# Patient Record
Sex: FEMALE | Race: WHITE | Employment: FULL TIME | URBAN - METROPOLITAN AREA
[De-identification: names, ages, dates, MRNs, and addresses within clinical notes are randomized per-mention and may not be internally consistent; named-entity substitution may affect disease eponyms.]

---

## 2017-02-17 ENCOUNTER — OFFICE VISIT (OUTPATIENT)
Dept: FAMILY MEDICINE CLINIC | Age: 32
End: 2017-02-17

## 2017-02-17 VITALS
DIASTOLIC BLOOD PRESSURE: 78 MMHG | SYSTOLIC BLOOD PRESSURE: 121 MMHG | WEIGHT: 194 LBS | HEART RATE: 95 BPM | BODY MASS INDEX: 35.7 KG/M2 | TEMPERATURE: 98.7 F | HEIGHT: 62 IN | RESPIRATION RATE: 16 BRPM

## 2017-02-17 DIAGNOSIS — M79.651 ACUTE THIGH PAIN, RIGHT: ICD-10-CM

## 2017-02-17 DIAGNOSIS — F41.8 DEPRESSION WITH ANXIETY: ICD-10-CM

## 2017-02-17 DIAGNOSIS — E03.4 HYPOTHYROIDISM DUE TO ACQUIRED ATROPHY OF THYROID: Primary | ICD-10-CM

## 2017-02-17 DIAGNOSIS — R20.2 NUMBNESS AND TINGLING: ICD-10-CM

## 2017-02-17 DIAGNOSIS — R20.0 NUMBNESS AND TINGLING: ICD-10-CM

## 2017-02-17 RX ORDER — LEVOTHYROXINE SODIUM 25 UG/1
25 TABLET ORAL
Qty: 30 TAB | Refills: 0 | Status: SHIPPED | OUTPATIENT
Start: 2017-02-17 | End: 2017-07-05 | Stop reason: SDUPTHER

## 2017-02-17 NOTE — MR AVS SNAPSHOT
Visit Information Date & Time Provider Department Dept. Phone Encounter #  
 2/17/2017 12:30 PM Mitul Thayer NP [facility] [id number] Follow-up Instructions Return if symptoms worsen or fail to improve. Upcoming Health Maintenance Date Due DTaP/Tdap/Td series (1 - Tdap) 12/7/2007 PAP AKA CERVICAL CYTOLOGY 3/21/2014 INFLUENZA AGE 9 TO ADULT 8/1/2016 Allergies as of 2/17/2017  Review Complete On: 2/17/2017 By: Alex Arboleda LPN No Known Allergies Current Immunizations  Reviewed on 2/17/2017 No immunizations on file. Reviewed by Alex Arboleda LPN on 0/05/5234 at 86:15 PM  
You Were Diagnosed With   
  
 Codes Comments Hypothyroidism due to acquired atrophy of thyroid    -  Primary ICD-10-CM: E03.4 ICD-9-CM: 244.8, 246.8 Depression with anxiety     ICD-10-CM: F41.8 ICD-9-CM: 300.4 Numbness and tingling     ICD-10-CM: R20.0, R20.2 ICD-9-CM: 782.0 Acute thigh pain, right     ICD-10-CM: V39.261 ICD-9-CM: 729.5 Vitals BP Pulse Temp Resp Height(growth percentile) Weight(growth percentile) 121/78 95 98.7 °F (37.1 °C) (Oral) 16 5' 2\" (1.575 m) 194 lb (88 kg) LMP BMI OB Status Smoking Status 02/01/2017 35.48 kg/m2 Having regular periods Never Smoker Vitals History BMI and BSA Data Body Mass Index Body Surface Area  
 35.48 kg/m 2 1.96 m 2 Preferred Pharmacy Pharmacy Name Phone [facility] [address] [place] [address] Doug Quezada Your Updated Medication List  
  
   
This list is accurate as of: 2/17/17  1:21 PM.  Always use your most recent med list.  
  
  
  
  
 levothyroxine 25 mcg tablet Commonly known as:  synthroid Take 1 Tab by mouth Daily (before breakfast). metroNIDAZOLE 0.75 % vaginal gel Commonly known as:  Joaquin Houston  
 Insert one applicator into vagina once weekly for 6 months. norgestimate-ethinyl estradiol 0.25-35 mg-mcg Tab Commonly known as:  [facility] (28) Take 1 Tab by mouth daily. tinidazole 500 mg tablet Commonly known as:  GOBWCEZW Take two tabs by mouth daily for 5 days. WELLBUTRIN 75 mg tablet Generic drug:  buPROPion Take  by mouth two (2) times a day. XANAX 0.5 mg tablet Generic drug:  ALPRAZolam  
Take  by mouth. Prescriptions Sent to Pharmacy Refills  
 levothyroxine (SYNTHROID) 25 mcg tablet 0 Sig: Take 1 Tab by mouth Daily (before breakfast). Class: Normal  
 Pharmacy: [facility] [address] [place] [address] #: [phone] Route: Oral  
  
Follow-up Instructions Return if symptoms worsen or fail to improve. To-Do List   
 02/17/2017 Lab:  HEMOGLOBIN A1C WITH EAG   
  
 02/17/2017 Lab:  T4, FREE   
  
 02/17/2017 Lab:  TSH 3RD GENERATION   
  
 02/17/2017 Lab:  VITAMIN B12 Patient Instructions Anxiety Disorder: Care Instructions Your Care Instructions Anxiety is a normal reaction to stress. Difficult situations can cause you to have symptoms such as sweaty palms and a nervous feeling. In an anxiety disorder, the symptoms are far more severe. Constant worry, muscle tension, trouble sleeping, nausea and diarrhea, and other symptoms can make normal daily activities difficult or impossible. These symptoms may occur for no reason, and they can affect your work, school, or social life. Medicines, counseling, and self-care can all help. Follow-up care is a key part of your treatment and safety. Be sure to make and go to all appointments, and call your doctor if you are having problems. It's also a good idea to know your test results and keep a list of the medicines you take. How can you care for yourself at home? · Take medicines exactly as directed. Call your doctor if you think you are having a problem with your medicine. · Go to your counseling sessions and follow-up appointments. · Recognize and accept your anxiety. Then, when you are in a situation that makes you anxious, say to yourself, \"This is not an emergency. I feel uncomfortable, but I am not in danger. I can keep going even if I feel anxious. \" · Be kind to your body: ¨ Relieve tension with exercise or a massage. ¨ Get enough rest. 
¨ Avoid alcohol, caffeine, nicotine, and illegal drugs. They can increase your anxiety level and cause sleep problems. ¨ Learn and do relaxation techniques. See below for more about these techniques. · Engage your mind. Get out and do something you enjoy. Go to a funny movie, or take a walk or hike. Plan your day. Having too much or too little to do can make you anxious. · Keep a record of your symptoms. Discuss your fears with a good friend or family member, or join a support group for people with similar problems. Talking to others sometimes relieves stress. · Get involved in social groups, or volunteer to help others. Being alone sometimes makes things seem worse than they are. · Get at least 30 minutes of exercise on most days of the week to relieve stress. Walking is a good choice. You also may want to do other activities, such as running, swimming, cycling, or playing tennis or team sports. Relaxation techniques Do relaxation exercises 10 to 20 minutes a day. You can play soothing, relaxing music while you do them, if you wish. · Tell others in your house that you are going to do your relaxation exercises. Ask them not to disturb you. · Find a comfortable place, away from all distractions and noise. · Lie down on your back, or sit with your back straight. · Focus on your breathing. Make it slow and steady. · Breathe in through your nose. Breathe out through either your nose or mouth. · Breathe deeply, filling up the area between your navel and your rib cage. Breathe so that your belly goes up and down. · Do not hold your breath. · Breathe like this for 5 to 10 minutes. Notice the feeling of calmness throughout your whole body. As you continue to breathe slowly and deeply, relax by doing the following for another 5 to 10 minutes: · Tighten and relax each muscle group in your body. You can begin at your toes and work your way up to your head. · Imagine your muscle groups relaxing and becoming heavy. · Empty your mind of all thoughts. · Let yourself relax more and more deeply. · Become aware of the state of calmness that surrounds you. · When your relaxation time is over, you can bring yourself back to alertness by moving your fingers and toes and then your hands and feet and then stretching and moving your entire body. Sometimes people fall asleep during relaxation, but they usually wake up shortly afterward. · Always give yourself time to return to full alertness before you drive a car or do anything that might cause an accident if you are not fully alert. Never play a relaxation tape while you drive a car. When should you call for help? Call 911 anytime you think you may need emergency care. For example, call if: 
· You feel you cannot stop from hurting yourself or someone else. Keep the numbers for these national suicide hotlines: 8-471-758-TALK (8-976.243.5474) and 7-808-NJFTFUV (8-763.278.9927). If you or someone you know talks about suicide or feeling hopeless, get help right away. Watch closely for changes in your health, and be sure to contact your doctor if: 
· You have anxiety or fear that affects your life. · You have symptoms of anxiety that are new or different from those you had before. Where can you learn more? Go to http://nima-hank.info/. Enter P754 in the search box to learn more about \"Anxiety Disorder: Care Instructions. \" 
 Current as of: July 26, 2016 Content Version: 11.1 © 6353-7532 Email Data Source. Care instructions adapted under license by [employer] (which disclaims liability or warranty for this information). If you have questions about a medical condition or this instruction, always ask your healthcare professional. [address] any warranty or liability for your use of this information. Hypothyroidism: Care Instructions Your Care Instructions You have hypothyroidism, which means that your body is not making enough thyroid hormone. This hormone helps your body use energy. If your thyroid level is low, you may feel tired, be constipated, have an increase in your blood pressure, or have dry skin or memory problems. You may also get cold easily, even when it is warm. Women with low thyroid levels may have heavy menstrual periods. A blood test to find your thyroid-stimulating hormone (TSH) level is used to check for hypothyroidism. A high TSH level may mean that you have low thyroid. When your body is not making enough thyroid hormone, TSH levels rise in an effort to make the body produce more. The treatment for hypothyroidism is to take thyroid hormone pills. You should start to feel better in 1 to 2 weeks. But it can take several months to see changes in the TSH level. You will need regular visits with your doctor to make sure you have the right dose of medicine. Most people need treatment for the rest of their lives. You will need to see your doctor regularly to have blood tests and to make sure you are doing well. Follow-up care is a key part of your treatment and safety. Be sure to make and go to all appointments, and call your doctor if you are having problems. Its also a good idea to know your test results and keep a list of the medicines you take. How can you care for yourself at home? · Take your thyroid hormone medicine exactly as prescribed.  Call your doctor if you think you are having a problem with your medicine. Most people do not have side effects if they take the right amount of medicine regularly. ¨ Take the medicine 30 minutes before breakfast, and do not take it with calcium, vitamins, or iron. ¨ Do not take extra doses of your thyroid medicine. It will not help you get better any faster, and it may cause side effects. ¨ If you forget to take a dose, do NOT take a double dose of medicine. Take your usual dose the next day. · Tell your doctor about all prescription, herbal, or over-the-counter products you take. · Take care of yourself. Eat a healthy diet, get enough sleep, and get regular exercise. When should you call for help? Call 911 anytime you think you may need emergency care. For example, call if: 
· You passed out (lost consciousness). · You have severe trouble breathing. · You have a very slow heartbeat (less than 60 beats a minute). · You have a low body temperature (95°F or below). Call your doctor now or seek immediate medical care if: 
· You feel tired, sluggish, or weak. · You have trouble remembering things or concentrating. · You do not begin to feel better 2 weeks after starting your medicine. Watch closely for changes in your health, and be sure to contact your doctor if you have any problems. Where can you learn more? Go to http://nima-hank.info/. Enter Z555 in the search box to learn more about \"Hypothyroidism: Care Instructions. \" Current as of: July 28, 2016 Content Version: 11.1 © 4115-7957 Star.me. Care instructions adapted under license by [employer] (which disclaims liability or warranty for this information). If you have questions about a medical condition or this instruction, always ask your healthcare professional. [address] any warranty or liability for your use of this information. Numbness and Tingling: Care Instructions Your Care Instructions Many things can cause numbness or tingling. Swelling may put pressure on a nerve. This could cause you to lose feeling or have a pins-and-needles sensation on part of your body. Nerves may be damaged from trauma, toxins, or diseases, such as diabetes or multiple sclerosis (MS). Sometimes, though, the cause is not clear. If there is no clear reason for your symptoms, and you are not having any other symptoms, your doctor may suggest watching and waiting for a while to see if the numbness or tingling goes away on its own. Your doctor may want you to have blood or nerve tests to find the cause of your symptoms. Follow-up care is a key part of your treatment and safety. Be sure to make and go to all appointments, and call your doctor if you are having problems. It's also a good idea to know your test results and keep a list of the medicines you take. How can you care for yourself at home? · If your doctor prescribes medicine, take it exactly as directed. Call your doctor if you think you are having a problem with your medicine. · If you have any swelling, put ice or a cold pack on the area for 10 to 20 minutes at a time. Put a thin cloth between the ice and your skin. When should you call for help? Call 911 anytime you think you may need emergency care. For example, call if: 
· You have weakness, numbness, or tingling in both legs. · You lose bowel or bladder control. · You have symptoms of a stroke. These may include: 
¨ Sudden numbness, tingling, weakness, or loss of movement in your face, arm, or leg, especially on only one side of your body. ¨ Sudden vision changes. ¨ Sudden trouble speaking. ¨ Sudden confusion or trouble understanding simple statements. ¨ Sudden problems with walking or balance. ¨ A sudden, severe headache that is different from past headaches. Watch closely for changes in your health, and be sure to contact your doctor if you have any problems, or if: · You do not get better as expected. Where can you learn more? Go to http://nima-hank.info/. Enter S116 in the search box to learn more about \"Numbness and Tingling: Care Instructions. \" Current as of: February 19, 2016 Content Version: 11.1 © 1748-3478 [employer]. Care instructions adapted under license by [employer] (which disclaims liability or warranty for this information). If you have questions about a medical condition or this instruction, always ask your healthcare professional. [address] any warranty or liability for your use of this information. Introducing [facility]! Dear Mike Dent: Thank you for requesting a [employer] account. Our records indicate that you have previously registered for a [employer]ME[employer] account but its currently inactive. Please call our [employer]ME[employer] support line at [phone]. Additional Information If you have questions, please visit the Frequently Asked Questions section of the JME[employer] website at [email or web address] Remember, JMEA is NOT to be used for urgent needs. For medical emergencies, dial 911. Now available from your iPhone and Android! Please provide this summary of care documentation to your next provider. Your primary care clinician is listed as Rico Gongora. If you have any questions after today's visit, please call [phone].

## 2017-02-17 NOTE — PATIENT INSTRUCTIONS
Anxiety Disorder: Care Instructions  Your Care Instructions  Anxiety is a normal reaction to stress. Difficult situations can cause you to have symptoms such as sweaty palms and a nervous feeling. In an anxiety disorder, the symptoms are far more severe. Constant worry, muscle tension, trouble sleeping, nausea and diarrhea, and other symptoms can make normal daily activities difficult or impossible. These symptoms may occur for no reason, and they can affect your work, school, or social life. Medicines, counseling, and self-care can all help. Follow-up care is a key part of your treatment and safety. Be sure to make and go to all appointments, and call your doctor if you are having problems. It's also a good idea to know your test results and keep a list of the medicines you take. How can you care for yourself at home? · Take medicines exactly as directed. Call your doctor if you think you are having a problem with your medicine. · Go to your counseling sessions and follow-up appointments. · Recognize and accept your anxiety. Then, when you are in a situation that makes you anxious, say to yourself, \"This is not an emergency. I feel uncomfortable, but I am not in danger. I can keep going even if I feel anxious. \"  · Be kind to your body:  ¨ Relieve tension with exercise or a massage. ¨ Get enough rest.  ¨ Avoid alcohol, caffeine, nicotine, and illegal drugs. They can increase your anxiety level and cause sleep problems. ¨ Learn and do relaxation techniques. See below for more about these techniques. · Engage your mind. Get out and do something you enjoy. Go to a funny movie, or take a walk or hike. Plan your day. Having too much or too little to do can make you anxious. · Keep a record of your symptoms. Discuss your fears with a good friend or family member, or join a support group for people with similar problems. Talking to others sometimes relieves stress.   · Get involved in social groups, or volunteer to help others. Being alone sometimes makes things seem worse than they are. · Get at least 30 minutes of exercise on most days of the week to relieve stress. Walking is a good choice. You also may want to do other activities, such as running, swimming, cycling, or playing tennis or team sports. Relaxation techniques  Do relaxation exercises 10 to 20 minutes a day. You can play soothing, relaxing music while you do them, if you wish. · Tell others in your house that you are going to do your relaxation exercises. Ask them not to disturb you. · Find a comfortable place, away from all distractions and noise. · Lie down on your back, or sit with your back straight. · Focus on your breathing. Make it slow and steady. · Breathe in through your nose. Breathe out through either your nose or mouth. · Breathe deeply, filling up the area between your navel and your rib cage. Breathe so that your belly goes up and down. · Do not hold your breath. · Breathe like this for 5 to 10 minutes. Notice the feeling of calmness throughout your whole body. As you continue to breathe slowly and deeply, relax by doing the following for another 5 to 10 minutes:  · Tighten and relax each muscle group in your body. You can begin at your toes and work your way up to your head. · Imagine your muscle groups relaxing and becoming heavy. · Empty your mind of all thoughts. · Let yourself relax more and more deeply. · Become aware of the state of calmness that surrounds you. · When your relaxation time is over, you can bring yourself back to alertness by moving your fingers and toes and then your hands and feet and then stretching and moving your entire body. Sometimes people fall asleep during relaxation, but they usually wake up shortly afterward. · Always give yourself time to return to full alertness before you drive a car or do anything that might cause an accident if you are not fully alert.  Never play a relaxation tape while you drive a car. When should you call for help? Call 911 anytime you think you may need emergency care. For example, call if:  · You feel you cannot stop from hurting yourself or someone else. Keep the numbers for these national suicide hotlines: 3-959-792-TALK (4-564.233.7381) and 2-760-FPOUPNK (3-278.520.8305). If you or someone you know talks about suicide or feeling hopeless, get help right away. Watch closely for changes in your health, and be sure to contact your doctor if:  · You have anxiety or fear that affects your life. · You have symptoms of anxiety that are new or different from those you had before. Where can you learn more? Go to http://nima-hank.info/. Enter P754 in the search box to learn more about \"Anxiety Disorder: Care Instructions. \"  Current as of: July 26, 2016  Content Version: 11.1  © 4063-2724 [employer]. Care instructions adapted under license by [employer] (which disclaims liability or warranty for this information). If you have questions about a medical condition or this instruction, always ask your healthcare professional. [address] any warranty or liability for your use of this information. Hypothyroidism: Care Instructions  Your Care Instructions  You have hypothyroidism, which means that your body is not making enough thyroid hormone. This hormone helps your body use energy. If your thyroid level is low, you may feel tired, be constipated, have an increase in your blood pressure, or have dry skin or memory problems. You may also get cold easily, even when it is warm. Women with low thyroid levels may have heavy menstrual periods. A blood test to find your thyroid-stimulating hormone (TSH) level is used to check for hypothyroidism. A high TSH level may mean that you have low thyroid.  When your body is not making enough thyroid hormone, TSH levels rise in an effort to make the body produce more. The treatment for hypothyroidism is to take thyroid hormone pills. You should start to feel better in 1 to 2 weeks. But it can take several months to see changes in the TSH level. You will need regular visits with your doctor to make sure you have the right dose of medicine. Most people need treatment for the rest of their lives. You will need to see your doctor regularly to have blood tests and to make sure you are doing well. Follow-up care is a key part of your treatment and safety. Be sure to make and go to all appointments, and call your doctor if you are having problems. Its also a good idea to know your test results and keep a list of the medicines you take. How can you care for yourself at home? · Take your thyroid hormone medicine exactly as prescribed. Call your doctor if you think you are having a problem with your medicine. Most people do not have side effects if they take the right amount of medicine regularly. ¨ Take the medicine 30 minutes before breakfast, and do not take it with calcium, vitamins, or iron. ¨ Do not take extra doses of your thyroid medicine. It will not help you get better any faster, and it may cause side effects. ¨ If you forget to take a dose, do NOT take a double dose of medicine. Take your usual dose the next day. · Tell your doctor about all prescription, herbal, or over-the-counter products you take. · Take care of yourself. Eat a healthy diet, get enough sleep, and get regular exercise. When should you call for help? Call 911 anytime you think you may need emergency care. For example, call if:  · You passed out (lost consciousness). · You have severe trouble breathing. · You have a very slow heartbeat (less than 60 beats a minute). · You have a low body temperature (95°F or below). Call your doctor now or seek immediate medical care if:  · You feel tired, sluggish, or weak. · You have trouble remembering things or concentrating.   · You do not begin to feel better 2 weeks after starting your medicine. Watch closely for changes in your health, and be sure to contact your doctor if you have any problems. Where can you learn more? Go to http://nima-hank.info/. Enter G383 in the search box to learn more about \"Hypothyroidism: Care Instructions. \"  Current as of: July 28, 2016  Content Version: 11.1  © 9840-6266 [employer]. Care instructions adapted under license by [employer] (which disclaims liability or warranty for this information). If you have questions about a medical condition or this instruction, always ask your healthcare professional. [place] [address] any warranty or liability for your use of this information. Numbness and Tingling: Care Instructions  Your Care Instructions  Many things can cause numbness or tingling. Swelling may put pressure on a nerve. This could cause you to lose feeling or have a pins-and-needles sensation on part of your body. Nerves may be damaged from trauma, toxins, or diseases, such as diabetes or multiple sclerosis (MS). Sometimes, though, the cause is not clear. If there is no clear reason for your symptoms, and you are not having any other symptoms, your doctor may suggest watching and waiting for a while to see if the numbness or tingling goes away on its own. Your doctor may want you to have blood or nerve tests to find the cause of your symptoms. Follow-up care is a key part of your treatment and safety. Be sure to make and go to all appointments, and call your doctor if you are having problems. It's also a good idea to know your test results and keep a list of the medicines you take. How can you care for yourself at home? · If your doctor prescribes medicine, take it exactly as directed. Call your doctor if you think you are having a problem with your medicine.   · If you have any swelling, put ice or a cold pack on the area for 10 to 20 minutes at a time. Put a thin cloth between the ice and your skin. When should you call for help? Call 911 anytime you think you may need emergency care. For example, call if:  · You have weakness, numbness, or tingling in both legs. · You lose bowel or bladder control. · You have symptoms of a stroke. These may include:  ¨ Sudden numbness, tingling, weakness, or loss of movement in your face, arm, or leg, especially on only one side of your body. ¨ Sudden vision changes. ¨ Sudden trouble speaking. ¨ Sudden confusion or trouble understanding simple statements. ¨ Sudden problems with walking or balance. ¨ A sudden, severe headache that is different from past headaches. Watch closely for changes in your health, and be sure to contact your doctor if you have any problems, or if:  · You do not get better as expected. Where can you learn more? Go to http://nima-hank.info/. Enter V264 in the search box to learn more about \"Numbness and Tingling: Care Instructions. \"  Current as of: February 19, 2016  Content Version: 11.1  © 5095-9847 [employer]. Care instructions adapted under license by [employer] (which disclaims liability or warranty for this information). If you have questions about a medical condition or this instruction, always ask your healthcare professional. [address] any warranty or liability for your use of this information.

## 2017-02-17 NOTE — PROGRESS NOTES
1. Have you been to the ER, urgent care clinic since your last visit? Hospitalized since your last visit? No    2. Have you seen or consulted any other health care providers outside of the [address] [place] [address] since your last visit? Include any pap smears or colon screening.  No

## 2017-02-18 LAB
EST. AVERAGE GLUCOSE BLD GHB EST-MCNC: 103 MG/DL
HBA1C MFR BLD: 5.2 % (ref 4.8–5.6)
T4 FREE SERPL-MCNC: 1.15 NG/DL (ref 0.82–1.77)
TSH SERPL DL<=0.005 MIU/L-ACNC: 3.79 UIU/ML (ref 0.45–4.5)
VIT B12 SERPL-MCNC: 413 PG/ML (ref 211–946)

## 2017-07-05 DIAGNOSIS — E03.4 HYPOTHYROIDISM DUE TO ACQUIRED ATROPHY OF THYROID: ICD-10-CM

## 2017-07-07 NOTE — TELEPHONE ENCOUNTER
Requested Prescriptions     Pending Prescriptions Disp Refills    levothyroxine (SYNTHROID) 25 mcg tablet 30 Tab 0     Sig: Take 1 Tab by mouth Daily (before breakfast).        Last visit: 2/17/2017   Next visit: none

## 2017-07-09 RX ORDER — LEVOTHYROXINE SODIUM 25 UG/1
25 TABLET ORAL
Qty: 30 TAB | Refills: 4 | Status: SHIPPED | OUTPATIENT
Start: 2017-07-09 | End: 2020-01-02

## 2017-08-21 ENCOUNTER — OFFICE VISIT (OUTPATIENT)
Dept: FAMILY MEDICINE CLINIC | Age: 32
End: 2017-08-21

## 2017-08-21 VITALS
SYSTOLIC BLOOD PRESSURE: 114 MMHG | DIASTOLIC BLOOD PRESSURE: 73 MMHG | TEMPERATURE: 98.7 F | WEIGHT: 193.6 LBS | BODY MASS INDEX: 35.63 KG/M2 | OXYGEN SATURATION: 98 % | RESPIRATION RATE: 16 BRPM | HEART RATE: 98 BPM | HEIGHT: 62 IN

## 2017-08-21 DIAGNOSIS — E03.4 HYPOTHYROIDISM DUE TO ACQUIRED ATROPHY OF THYROID: Primary | ICD-10-CM

## 2017-08-21 NOTE — MR AVS SNAPSHOT
Visit Information Date & Time Provider Department Dept. Phone Encounter #  
 8/21/2017  4:00 PM [facility] [phone] [id number] Follow-up Instructions Return if symptoms worsen or fail to improve, for return on Wednesday for TSH and T4 lab. Upcoming Health Maintenance Date Due DTaP/Tdap/Td series (1 - Tdap) 12/7/2007 PAP AKA CERVICAL CYTOLOGY 3/21/2014 INFLUENZA AGE 9 TO ADULT 9/15/2017* *Topic was postponed. The date shown is not the original due date. Allergies as of 8/21/2017  Review Complete On: 8/21/2017 By: Renée Rodriguez LPN No Known Allergies Current Immunizations  Reviewed on 2/17/2017 No immunizations on file. Not reviewed this visit You Were Diagnosed With   
  
 Codes Comments Hypothyroidism due to acquired atrophy of thyroid    -  Primary ICD-10-CM: E03.4 ICD-9-CM: 244.8, 246.8 Vitals BP Pulse Temp Resp Height(growth percentile) Weight(growth percentile) 114/73 (BP 1 Location: Left arm, BP Patient Position: Sitting) 98 98.7 °F (37.1 °C) (Oral) 16 5' 2\" (1.575 m) 193 lb 9.6 oz (87.8 kg) LMP SpO2 BMI OB Status Smoking Status 02/01/2017 98% 35.41 kg/m2 Pregnant Never Smoker Vitals History BMI and BSA Data Body Mass Index Body Surface Area  
 35.41 kg/m 2 1.96 m 2 Preferred Pharmacy Pharmacy Name Phone [facility] [address] [place] [address] Doug Quezada Your Updated Medication List  
  
   
This list is accurate as of: 8/21/17  4:51 PM.  Always use your most recent med list.  
  
  
  
  
 levothyroxine 25 mcg tablet Commonly known as:  synthroid Take 1 Tab by mouth Daily (before breakfast). norgestimate-ethinyl estradiol 0.25-35 mg-mcg Tab Commonly known as:  [facility] (28) Take 1 Tab by mouth daily. PRENATAL + DHA PO Take  by mouth.  
  
 tinidazole 500 mg tablet Commonly known as:  VIFOOKXS Take two tabs by mouth daily for 5 days. TYLENOL PO Take 500 mg by mouth as needed. WELLBUTRIN 75 mg tablet Generic drug:  buPROPion Take  by mouth two (2) times a day. Follow-up Instructions Return if symptoms worsen or fail to improve, for return on Wednesday for TSH and T4 lab. To-Do List   
 08/21/2017 Lab:  T4, FREE   
  
 08/21/2017 Lab:  TSH 3RD GENERATION Introducing [facility] SERVICES! Dear Derik Troy: Thank you for requesting a Scienion account. Our records indicate that you have previously registered for a Scienion account but its currently inactive. Please call our Scienion support line at [phone]. Additional Information If you have questions, please visit the Frequently Asked Questions section of the Scienion website at [email or web address] Remember, Scienion is NOT to be used for urgent needs. For medical emergencies, dial 911. Now available from your iPhone and Android! Please provide this summary of care documentation to your next provider. Your primary care clinician is listed as Bard Frausto. If you have any questions after today's visit, please call [phone].

## 2017-08-21 NOTE — PROGRESS NOTES
Chief Complaint   Patient presents with    Thyroid Problem     thyroid check, reportedly low thyroid levels     HPI:    A 29 y/o female patient who presents today for hypothyroidism. Have been on synthroid for many years. Currently  4 months pregnant- had labs checked at t GYN over a month ago. Unsure of labs value but was advised by GYN to have a repeat thyroid function with PCP      Currently on  25 mcg of Synthroid      ROS:pertinent positives as noted in HPI. All others were negative      Past Medical History:   Diagnosis Date    Depression 1 year    anxiety     Thyroid disease      Social History     Social History    Marital status: SINGLE     Spouse name: N/A    Number of children: N/A    Years of education: N/A     Occupational History    Not on file. Social History Main Topics    Smoking status: Never Smoker    Smokeless tobacco: Never Used    Alcohol use 1.2 oz/week     2 Glasses of wine per week      Comment: socially    Drug use: No    Sexual activity: Yes     Partners: Male     Birth control/ protection: IUD     Other Topics Concern    Not on file     Social History Narrative       Current Outpatient Prescriptions   Medication Sig Dispense Refill    PRENATAL VIT 91/IRON/FOLIC/DHA (PRENATAL + DHA PO) Take  by mouth.  ACETAMINOPHEN (TYLENOL PO) Take 500 mg by mouth as needed.  levothyroxine (SYNTHROID) 25 mcg tablet Take 1 Tab by mouth Daily (before breakfast). 30 Tab 4    norgestimate-ethinyl estradiol (SPRINTEC, 28,) 0.25-35 mg-mcg tab Take 1 Tab by mouth daily. 1 Dose Pack 12    tinidazole 500 mg tablet Take two tabs by mouth daily for 5 days. 10 Tab 0    buPROPion (WELLBUTRIN) 75 mg tablet Take  by mouth two (2) times a day.        No Known Allergies    Physical Exam:    Vital Signs:     Visit Vitals    /73 (BP 1 Location: Left arm, BP Patient Position: Sitting)    Pulse 98    Temp 98.7 °F (37.1 °C) (Oral)    Resp 16    Ht 5' 2\" (1.575 m)    Wt 193 lb 9.6 oz (87.8 kg)    LMP 02/01/2017    SpO2 98%    BMI 35.41 kg/m2         General: a, a & o x 3, afebrile,  interacting appropriately, in no acute distress  Respiratory: lung sounds clear bilaterally, no wheezes or crackles  Cardiovascular: normal S1S2, regular rate and rhythm, no murmurs        Assessment/Plan:      ICD-10-CM ICD-9-CM    1. Hypothyroidism due to acquired atrophy of thyroid E03.4 244.8 TSH 3RD GENERATION     246.8 T4, FREE           Additional Notes: Discussed today's diagnosis, treatment plans. Discussed medication indications and side effects. After Visit Summary: Provided and discussed printed patient instructions. Answered questions in relation to today's diagnosis.   Follow-up Disposition: as needed and return on Wednesday for lab draw        Mati Alcazar NP-BC  Family Medicine  [facility]        Orders Placed This Encounter    TSH 3RD GENERATION    T4, FREE    PRENATAL VIT 91/IRON/FOLIC/DHA (PRENATAL + DHA PO)    ACETAMINOPHEN (TYLENOL PO)

## 2017-08-21 NOTE — PROGRESS NOTES
Donte Santana presents today for   Chief Complaint   Patient presents with    Thyroid Problem     thyroid check, reportedly low thyroid levels       Donte Santana preferred language for health care discussion is english/other. Is someone accompanying this pt? no    Is the patient using any DME equipment during OV? no    Depression Screening:  PHQ over the last two weeks 7/20/2016   Little interest or pleasure in doing things Several days   Feeling down, depressed or hopeless More than half the days   Total Score PHQ 2 3       Learning Assessment:  Learning Assessment 7/20/2016   PRIMARY LEARNER Patient   HIGHEST LEVEL OF EDUCATION - PRIMARY LEARNER  2 YEARS OF COLLEGE   BARRIERS PRIMARY LEARNER NONE   CO-LEARNER CAREGIVER No   PRIMARY LANGUAGE ENGLISH   LEARNER PREFERENCE PRIMARY DEMONSTRATION   ANSWERED BY Patient   RELATIONSHIP SELF       Abuse Screening:  Abuse Screening Questionnaire 2/17/2017   Do you ever feel afraid of your partner? N   Are you in a relationship with someone who physically or mentally threatens you? N   Is it safe for you to go home? Y       Fall Risk  No flowsheet data found. Health Maintenance reviewed and discussed per provider. Yes    Donte Santana is due for pap smear (record request), TDAP vax. Please order/place referral if appropriate. Advance Directive:  1. Do you have an advance directive in place? Patient Reply: no    2. If not, would you like material regarding how to put one in place? Patient Reply: no    Coordination of Care:  1. Have you been to the ER, urgent care clinic since your last visit? Hospitalized since your last visit? no    2. Have you seen or consulted any other health care providers outside of the [address] [place] [address] since your last visit? Include any pap smears or colon screening.  no

## 2019-04-10 ENCOUNTER — OFFICE VISIT (OUTPATIENT)
Dept: FAMILY MEDICINE CLINIC | Age: 34
End: 2019-04-10

## 2019-04-10 VITALS
BODY MASS INDEX: 34.41 KG/M2 | OXYGEN SATURATION: 98 % | WEIGHT: 187 LBS | HEART RATE: 83 BPM | HEIGHT: 62 IN | DIASTOLIC BLOOD PRESSURE: 85 MMHG | RESPIRATION RATE: 18 BRPM | TEMPERATURE: 97.8 F | SYSTOLIC BLOOD PRESSURE: 123 MMHG

## 2019-04-10 DIAGNOSIS — R35.89 POLYURIA: ICD-10-CM

## 2019-04-10 DIAGNOSIS — L85.3 DRY SKIN: ICD-10-CM

## 2019-04-10 DIAGNOSIS — E03.4 HYPOTHYROIDISM DUE TO ACQUIRED ATROPHY OF THYROID: Primary | ICD-10-CM

## 2019-04-10 DIAGNOSIS — L65.9 HAIR LOSS: ICD-10-CM

## 2019-04-10 DIAGNOSIS — R63.1 POLYDIPSIA: ICD-10-CM

## 2019-04-10 DIAGNOSIS — R53.82 CHRONIC FATIGUE: ICD-10-CM

## 2019-04-10 DIAGNOSIS — E66.09 CLASS 1 OBESITY DUE TO EXCESS CALORIES WITHOUT SERIOUS COMORBIDITY WITH BODY MASS INDEX (BMI) OF 32.0 TO 32.9 IN ADULT: ICD-10-CM

## 2019-04-10 NOTE — PROGRESS NOTES
1. Have you been to the ER, urgent care clinic since your last visit? Hospitalized since your last visit? No    2. Have you seen or consulted any other health care providers outside of the [address] [place] [address] since your last visit? Include any pap smears or colon screening. No     Chief Complaint   Patient presents with    Medication Refill     levothyroxine       Visit Vitals  Ht 5' 2\" (1.575 m)   Wt 187 lb (84.8 kg)   BMI 34.20 kg/m²     Visit Vitals  /85 (BP 1 Location: Left arm, BP Patient Position: At rest)   Pulse 83   Temp 97.8 °F (36.6 °C)   Resp 18   Ht 5' 2\" (1.575 m)   Wt 187 lb (84.8 kg)   LMP 04/04/2019   SpO2 98%   Breastfeeding?  Unknown   BMI 34.20 kg/m²

## 2019-04-10 NOTE — PROGRESS NOTES
[address]               [phone]      Lars Bey is a 29 y.o. female and presents with Medication Refill (levothyroxine)         Subjective:    Hypothyroidism  TSH   Date Value Ref Range Status   04/15/2019 2.560 0.450 - 4.500 uIU/mL Final     Feeling very tired  Having hair loss, excessive dry skin,   Last synthroid dose 6 months ago, unable to lose weight, hair on chin,   Denies myalgia  Had moved to [place] and could not get established with a doctor  Last here two years ago 8/17    Urinates approx 8 times a day, large amounts, very light yellow  thirsty all the time, drinks a lot of water      ROS:  As stated in HPI, otherwise negative. ROS    The problem list was updated as a part of today's visit. Patient Active Problem List   Diagnosis Code    Hypothyroidism due to acquired atrophy of thyroid E03.4    Anxiety F41.9       The PSH, FH were reviewed. SH:  Social History     Tobacco Use    Smoking status: Never Smoker    Smokeless tobacco: Never Used   Substance Use Topics    Alcohol use: Yes     Alcohol/week: 1.2 oz     Types: 2 Glasses of wine per week     Comment: socially    Drug use: No       Medications/Allergies:  Current Outpatient Medications on File Prior to Visit   Medication Sig Dispense Refill    alprazolam (XANAX PO) Take  by mouth.  levothyroxine (SYNTHROID) 25 mcg tablet Take 1 Tab by mouth Daily (before breakfast). 30 Tab 4    PRENATAL VIT 91/IRON/FOLIC/DHA (PRENATAL + DHA PO) Take  by mouth.  ACETAMINOPHEN (TYLENOL PO) Take 500 mg by mouth as needed.  norgestimate-ethinyl estradiol (SPRINTEC, 28,) 0.25-35 mg-mcg tab Take 1 Tab by mouth daily. 1 Dose Pack 12    tinidazole 500 mg tablet Take two tabs by mouth daily for 5 days. 10 Tab 0    buPROPion (WELLBUTRIN) 75 mg tablet Take  by mouth two (2) times a day. No current facility-administered medications on file prior to visit. No Known Allergies    Objective:  Visit Vitals  /85 (BP 1 Location: Left arm, BP Patient Position: At rest)   Pulse 83   Temp 97.8 °F (36.6 °C)   Resp 18   Ht 5' 2\" (1.575 m)   Wt 187 lb (84.8 kg)   LMP 04/04/2019   SpO2 98%   Breastfeeding? Unknown   BMI 34.20 kg/m²    Body mass index is 34.2 kg/m². Physical assessment  Physical Exam   Constitutional: She is oriented to person, place, and time and well-developed, well-nourished, and in no distress. HENT:   Head: Hair is normal.   Neck: Normal range of motion. No JVD present. Cardiovascular: Normal rate, regular rhythm, normal heart sounds and intact distal pulses. Exam reveals no gallop and no friction rub. No murmur heard. Pulmonary/Chest: Effort normal and breath sounds normal.   Neurological: She is alert and oriented to person, place, and time. Skin: Skin is warm and dry. Psychiatric: Memory, affect and judgment normal.   Nursing note and vitals reviewed. Labwork and Ancillary Studies:    CBC w/Diff  Lab Results   Component Value Date/Time    WBC 9.9 04/15/2019 01:16 AM    HGB 12.8 04/15/2019 01:16 AM    PLATELET 296 (H) 51/49/8119 01:16 AM         Basic Metabolic Profile  Lab Results   Component Value Date/Time    Sodium 141 04/15/2019 01:16 AM    Potassium 4.3 04/15/2019 01:16 AM    Chloride 102 04/15/2019 01:16 AM    CO2 25 04/15/2019 01:16 AM    Glucose 78 04/15/2019 01:16 AM    BUN 10 04/15/2019 01:16 AM    Creatinine 0.60 04/15/2019 01:16 AM    BUN/Creatinine ratio 17 04/15/2019 01:16 AM    GFR est  04/15/2019 01:16 AM    GFR est non- 04/15/2019 01:16 AM    Calcium 9.8 04/15/2019 01:16 AM         Cholesterol  Lab Results   Component Value Date/Time    Cholesterol, total 142 04/15/2019 01:16 AM    HDL Cholesterol 45 04/15/2019 01:16 AM    LDL, calculated 85 04/15/2019 01:16 AM    Triglyceride 58 04/15/2019 01:16 AM       Assessment/Plan:    Diagnoses and all orders for this visit:    1.  Hypothyroidism due to acquired atrophy of thyroid  -     TSH 3RD GENERATION; Future  -     T4, FREE; Future  -Has not had synthroid for several months  -check levels today  2. Polydipsia  -     METABOLIC PANEL, COMPREHENSIVE; Future  -     HEMOGLOBIN A1C WITH EAG; Future  -exhibiting symptoms of hyperglycemia, check labs  3. Polyuria  -     METABOLIC PANEL, COMPREHENSIVE; Future  -     HEMOGLOBIN A1C WITH EAG; Future    4. Chronic fatigue  -     TSH 3RD GENERATION; Future  -     T4, FREE; Future  -     CBC W/O DIFF; Future  -     HEMOGLOBIN A1C WITH EAG; Future  -could be related to not having synthroid medication  -check labs    5. Hair loss  -     TSH 3RD GENERATION; Future  -     T4, FREE; Future  -     CBC W/O DIFF; Future  -could be related to hypthyroid or anemia  -check labs    6. Dry skin  -     TSH 3RD GENERATION; Future  -     T4, FREE; Future    7. Class 1 obesity due to excess calories without serious comorbidity with body mass index (BMI) of 32.0 to 32.9 in adult  -     METABOLIC PANEL, COMPREHENSIVE; Future  -     LIPID PANEL; Future  Discussed the patient's BMI with him. The BMI follow up plan is as follows:     dietary management education, guidance, and counseling  encourage exercise  monitor weight  prescribed dietary intake  Discussed portion control  Eat fresh or frozen fruits and vegetables, stay away from canned  Limit eating out and fast food  Practice meal planning    Other orders  -     METABOLIC PANEL, COMPREHENSIVE  -     CBC W/O DIFF  -     LIPID PANEL  -     HEMOGLOBIN A1C WITH EAG  -     TSH 3RD GENERATION  -     T4, FREE  -     CVD REPORT        Health Maintenance:   Health Maintenance   Topic Date Due    PAP AKA CERVICAL CYTOLOGY  03/21/2014    Influenza Age 5 to Adult  08/01/2019    DTaP/Tdap/Td series (3 - Td) 11/08/2027    Pneumococcal 0-64 years  Aged Out         I have discussed the diagnosis with the patient and the intended plan as seen in the above orders.   The patient has received an After-Visit Summary and questions were answered concerning future plans. An After Visit Summary was printed and given to the patient. All diagnosis have been discussed with the patient and all of the patient's questions have been answered. Follow-up and Dispositions    · Return for Annual physical, 30 minutes, Dr Alyssa Martinez. Linda Fuller, [facility]  [address]  [id number]

## 2019-04-16 LAB
ALBUMIN SERPL-MCNC: 4.5 G/DL (ref 3.5–5.5)
ALBUMIN/GLOB SERPL: 1.8 {RATIO} (ref 1.2–2.2)
ALP SERPL-CCNC: 84 IU/L (ref 39–117)
ALT SERPL-CCNC: 17 IU/L (ref 0–32)
AST SERPL-CCNC: 20 IU/L (ref 0–40)
BILIRUB SERPL-MCNC: 0.3 MG/DL (ref 0–1.2)
BUN SERPL-MCNC: 10 MG/DL (ref 6–20)
BUN/CREAT SERPL: 17 (ref 9–23)
CALCIUM SERPL-MCNC: 9.8 MG/DL (ref 8.7–10.2)
CHLORIDE SERPL-SCNC: 102 MMOL/L (ref 96–106)
CHOLEST SERPL-MCNC: 142 MG/DL (ref 100–199)
CO2 SERPL-SCNC: 25 MMOL/L (ref 20–29)
CREAT SERPL-MCNC: 0.6 MG/DL (ref 0.57–1)
ERYTHROCYTE [DISTWIDTH] IN BLOOD BY AUTOMATED COUNT: 13.1 % (ref 12.3–15.4)
EST. AVERAGE GLUCOSE BLD GHB EST-MCNC: 103 MG/DL
GLOBULIN SER CALC-MCNC: 2.5 G/DL (ref 1.5–4.5)
GLUCOSE SERPL-MCNC: 78 MG/DL (ref 65–99)
HBA1C MFR BLD: 5.2 % (ref 4.8–5.6)
HCT VFR BLD AUTO: 39.1 % (ref 34–46.6)
HDLC SERPL-MCNC: 45 MG/DL
HGB BLD-MCNC: 12.8 G/DL (ref 11.1–15.9)
INTERPRETATION, 910389: NORMAL
LDLC SERPL CALC-MCNC: 85 MG/DL (ref 0–99)
MCH RBC QN AUTO: 29.5 PG (ref 26.6–33)
MCHC RBC AUTO-ENTMCNC: 32.7 G/DL (ref 31.5–35.7)
MCV RBC AUTO: 90 FL (ref 79–97)
PLATELET # BLD AUTO: 382 X10E3/UL (ref 150–379)
POTASSIUM SERPL-SCNC: 4.3 MMOL/L (ref 3.5–5.2)
PROT SERPL-MCNC: 7 G/DL (ref 6–8.5)
RBC # BLD AUTO: 4.34 X10E6/UL (ref 3.77–5.28)
SODIUM SERPL-SCNC: 141 MMOL/L (ref 134–144)
T4 FREE SERPL-MCNC: 1.12 NG/DL (ref 0.82–1.77)
TRIGL SERPL-MCNC: 58 MG/DL (ref 0–149)
TSH SERPL DL<=0.005 MIU/L-ACNC: 2.56 UIU/ML (ref 0.45–4.5)
VLDLC SERPL CALC-MCNC: 12 MG/DL (ref 5–40)
WBC # BLD AUTO: 9.9 X10E3/UL (ref 3.4–10.8)

## 2019-04-30 ENCOUNTER — TELEPHONE (OUTPATIENT)
Dept: FAMILY MEDICINE CLINIC | Age: 34
End: 2019-04-30

## 2019-05-01 NOTE — TELEPHONE ENCOUNTER
Called patient at [phone] she was made aware Levothyronie will not be refill based on her labs. Patient will have to schedule appointment to address acute symptom hair loss, fatigue, and cold sensation. Patient transfer over to  to schedule appointment.

## 2019-05-07 ENCOUNTER — OFFICE VISIT (OUTPATIENT)
Dept: FAMILY MEDICINE CLINIC | Age: 34
End: 2019-05-07

## 2019-05-07 VITALS
TEMPERATURE: 97.3 F | BODY MASS INDEX: 34.3 KG/M2 | HEART RATE: 95 BPM | WEIGHT: 186.4 LBS | OXYGEN SATURATION: 98 % | HEIGHT: 62 IN | SYSTOLIC BLOOD PRESSURE: 135 MMHG | DIASTOLIC BLOOD PRESSURE: 74 MMHG | RESPIRATION RATE: 16 BRPM

## 2019-05-07 DIAGNOSIS — E03.4 HYPOTHYROIDISM DUE TO ACQUIRED ATROPHY OF THYROID: Primary | ICD-10-CM

## 2019-05-07 NOTE — PROGRESS NOTES
[address] [place], [place] [address] [address]               [phone]      Lemuel Cotton is a [address] y.o. female and presents with Cold extremity (last 3 months ); Fatigue (last 3 month ); and Hair/Scalp Problem (last 3 months )         Subjective:    Hypothyroid  Started in 2010, mostly based on symptoms  Started on synthroid by her then PCP  Symptoms: Cold all the time, hair loss, inability to lose weight, tired all the time causing her to struggle to do her job  Has a hard time paying attention, does sleeps through the night  hirsutism   Concerned about increase hair growth on chin score   Ferriman-Gallwey hirsutism scoring of a 7  Menses[de-identified] regular, a couple days later may have light spotting for a week or two, enough for panty liner    ROS:  As stated in HPI, otherwise all others negative. ROS    The problem list was updated as a part of today's visit. Patient Active Problem List   Diagnosis Code    Hypothyroidism due to acquired atrophy of thyroid E03.4    Anxiety F41.9       The PSH, FH were reviewed. SH:  Social History     Tobacco Use    Smoking status: Never Smoker    Smokeless tobacco: Never Used   Substance Use Topics    Alcohol use: Yes     Alcohol/week: 1.2 oz     Types: 2 Glasses of wine per week     Comment: socially    Drug use: No       Medications/Allergies:  Current Outpatient Medications on File Prior to Visit   Medication Sig Dispense Refill    alprazolam (XANAX PO) Take  by mouth.  PRENATAL VIT 91/IRON/FOLIC/DHA (PRENATAL + DHA PO) Take  by mouth.  ACETAMINOPHEN (TYLENOL PO) Take 500 mg by mouth as needed.  levothyroxine (SYNTHROID) 25 mcg tablet Take 1 Tab by mouth Daily (before breakfast). 30 Tab 4    norgestimate-ethinyl estradiol (SPRINTEC, 28,) 0.25-35 mg-mcg tab Take 1 Tab by mouth daily. 1 Dose Pack 12    tinidazole 500 mg tablet Take two tabs by mouth daily for 5 days.  10 Tab 0    buPROPion (WELLBUTRIN) 75 mg tablet Take  by mouth two (2) times a day. No current facility-administered medications on file prior to visit. No Known Allergies    Objective:  Visit Vitals  /74 (BP 1 Location: Left arm, BP Patient Position: At rest)   Pulse 95   Temp 97.3 °F (36.3 °C)   Resp 16   Ht 5' 2\" (1.575 m)   Wt 186 lb 6.4 oz (84.6 kg)   LMP 05/02/2019   SpO2 98%   BMI 34.09 kg/m²    Body mass index is 34.09 kg/m². Physical assessment  Physical Exam   Constitutional: She is oriented to person, place, and time and well-developed, well-nourished, and in no distress. Vital signs are normal.   HENT:   Head: Normocephalic and atraumatic. Cardiovascular: Normal rate, regular rhythm and normal heart sounds. Pulmonary/Chest: Effort normal and breath sounds normal.   Neurological: She is alert and oriented to person, place, and time. Gait normal.   Skin: Skin is warm, dry and intact. Nursing note and vitals reviewed.         Labwork and Ancillary Studies:    CBC w/Diff  Lab Results   Component Value Date/Time    WBC 9.9 04/15/2019 01:16 AM    HGB 12.8 04/15/2019 01:16 AM    PLATELET 771 (H) 24/87/9254 01:16 AM         Basic Metabolic Profile  Lab Results   Component Value Date/Time    Sodium 141 04/15/2019 01:16 AM    Potassium 4.3 04/15/2019 01:16 AM    Chloride 102 04/15/2019 01:16 AM    CO2 25 04/15/2019 01:16 AM    Glucose 78 04/15/2019 01:16 AM    BUN 10 04/15/2019 01:16 AM    Creatinine 0.60 04/15/2019 01:16 AM    BUN/Creatinine ratio 17 04/15/2019 01:16 AM    GFR est  04/15/2019 01:16 AM    GFR est non- 04/15/2019 01:16 AM    Calcium 9.8 04/15/2019 01:16 AM        Cholesterol  Lab Results   Component Value Date/Time    Cholesterol, total 142 04/15/2019 01:16 AM    HDL Cholesterol 45 04/15/2019 01:16 AM    LDL, calculated 85 04/15/2019 01:16 AM    Triglyceride 58 04/15/2019 01:16 AM       Health Maintenance:   Health Maintenance   Topic Date Due    PAP AKA CERVICAL CYTOLOGY  03/21/2014    Influenza Age 5 to Adult 08/01/2019    DTaP/Tdap/Td series (3 - Td) 11/08/2027    Pneumococcal 0-64 years  Aged Out       Assessment/Plan:    Diagnoses and all orders for this visit:    1. Hypothyroidism due to acquired atrophy of thyroid  -     TSH 3RD GENERATION; Future  -     T4, FREE; Future  -     T3 TOTAL; Future    Other orders  -     T4, FREE  -     TSH 3RD GENERATION  -     T3 TOTAL    mrs downing had been on thyroid replacement despite normal thyroid hormone levels  I explained to her it is contraindicated to treat by symptoms alone  I also educated her there could be other reasons for her symptoms  Redraw levels today  She has an upcoming appointment with Dr. Kentrell Elizondo  I will consult with Dr. Kamla Marroquin on the best intervention if the levels return normal    Addendum  5/9 thyroid levels came back normal, refer to Endocrinology     Greater than 50% of the time was spent in counseling and coordination of care. I have discussed the diagnosis with the patient and the intended plan as seen in the above orders. The patient has received an After-Visit Summary and questions were answered concerning future plans. An After Visit Summary was printed and given to the patient. All diagnosis have been discussed with the patient and all of the patient's questions have been answered. Follow-up and Dispositions    · Return if symptoms worsen or fail to improve. Herminio Iqbal, JOCEP-BC  [address] [facility]   [address] [facility] [address]  [phone]

## 2019-05-07 NOTE — PROGRESS NOTES
1. Have you been to the ER, urgent care clinic since your last visit? Hospitalized since your last visit? No    2. Have you seen or consulted any other health care providers outside of the [address] [place] [address] since your last visit? Include any pap smears or colon screening.  No     Chief Complaint   Patient presents with    Cold extremity     last 3 months     Fatigue     last 3 month     Hair/Scalp Problem     last 3 months      Visit Vitals  /74 (BP 1 Location: Left arm, BP Patient Position: At rest)   Pulse 95   Temp 97.3 °F (36.3 °C)   Resp 16   Ht 5' 2\" (1.575 m)   Wt 186 lb 6.4 oz (84.6 kg)   LMP 05/02/2019   SpO2 98%   BMI 34.09 kg/m²

## 2019-05-08 LAB
T3 SERPL-MCNC: 125 NG/DL (ref 71–180)
T4 FREE SERPL-MCNC: 1.23 NG/DL (ref 0.82–1.77)
TSH SERPL DL<=0.005 MIU/L-ACNC: 2.38 UIU/ML (ref 0.45–4.5)

## 2019-05-09 ENCOUNTER — TELEPHONE (OUTPATIENT)
Dept: FAMILY MEDICINE CLINIC | Age: 34
End: 2019-05-09

## 2019-05-09 DIAGNOSIS — L85.3 DRY SKIN: ICD-10-CM

## 2019-05-09 DIAGNOSIS — R53.82 CHRONIC FATIGUE: ICD-10-CM

## 2019-05-09 DIAGNOSIS — L65.9 HAIR LOSS: Primary | ICD-10-CM

## 2019-05-09 NOTE — TELEPHONE ENCOUNTER
Called mrs. Christelle Hilton to review her labs and let her know Dr. Laureen Peña recommended she see an endocrinologist and will talk to her more about it on her visit 6/10/19  Asked mrs downing which endocrinologist she preferred and referral entered. All diagnosis have been discussed with the patient and all of the patient's questions have been answered.

## 2020-01-02 ENCOUNTER — OFFICE VISIT (OUTPATIENT)
Dept: FAMILY MEDICINE CLINIC | Age: 35
End: 2020-01-02

## 2020-01-02 VITALS
DIASTOLIC BLOOD PRESSURE: 56 MMHG | HEIGHT: 62 IN | OXYGEN SATURATION: 100 % | HEART RATE: 92 BPM | BODY MASS INDEX: 34.04 KG/M2 | SYSTOLIC BLOOD PRESSURE: 107 MMHG | WEIGHT: 185 LBS | RESPIRATION RATE: 12 BRPM

## 2020-01-02 DIAGNOSIS — L68.0 HIRSUTISM: ICD-10-CM

## 2020-01-02 DIAGNOSIS — L70.9 ACNE, UNSPECIFIED ACNE TYPE: ICD-10-CM

## 2020-01-02 DIAGNOSIS — Z23 ENCOUNTER FOR IMMUNIZATION: ICD-10-CM

## 2020-01-02 DIAGNOSIS — E03.4 HYPOTHYROIDISM DUE TO ACQUIRED ATROPHY OF THYROID: Primary | ICD-10-CM

## 2020-01-02 DIAGNOSIS — E66.09 CLASS 1 OBESITY DUE TO EXCESS CALORIES WITHOUT SERIOUS COMORBIDITY WITH BODY MASS INDEX (BMI) OF 32.0 TO 32.9 IN ADULT: ICD-10-CM

## 2020-01-02 RX ORDER — ERYTHROMYCIN AND BENZOYL PEROXIDE 30; 50 MG/G; MG/G
GEL TOPICAL 2 TIMES DAILY
Qty: 46.6 G | Refills: 1 | Status: SHIPPED | OUTPATIENT
Start: 2020-01-02 | End: 2020-02-20 | Stop reason: RX

## 2020-01-02 RX ORDER — LEVOTHYROXINE SODIUM 25 UG/1
25 TABLET ORAL
Qty: 30 TAB | Refills: 4 | Status: SHIPPED | OUTPATIENT
Start: 2020-01-02 | End: 2020-03-09 | Stop reason: DRUGHIGH

## 2020-01-02 NOTE — PROGRESS NOTES
After obtaining consent, and per orders of Dr. Salome Contreras, injection of influenza vaccine given by Michelle Kwon LPN. Patient tolerated injection well and observed for 5 minutes after injection. No signs or symptoms of adverse reactions noted.

## 2020-01-02 NOTE — PROGRESS NOTES
Chief Complaint   Patient presents with    Follow-up       1. Have you been to the ER, urgent care clinic since your last visit? Hospitalized since your last visit? NO    2. Have you seen or consulted any other health care providers outside of the [address] [place] [address] since your last visit? Include any pap smears or colon screening.  NO

## 2020-01-02 NOTE — PATIENT INSTRUCTIONS

## 2020-01-02 NOTE — PROGRESS NOTES
Chief Complaint   Patient presents with    Follow-up       Pt is a 36y.o. year old female who presents for follow up of her chronic medical problems      Health Maintenance Due   Topic Date Due    PAP AKA CERVICAL CYTOLOGY -[facility] 03/21/2014    Influenza Age 5 to Adult -today 08/01/2019     Saw her last in 2016; was following up with NP    Lab Results   Component Value Date/Time    TSH 2.380 05/07/2019 10:10 AM     Has seen Endo since?no had to reschedule    Prev saw Dr Mike Bartlett in 2014-borderline hypothyroidism    Synthroid    Stopped it for over a year now when she got pregnant in 2018 (3rd baby)  Not breastfeeding anymore    Hair is coming out so bad; hair on the chin since-worse in the last 6-8 months  Cannot lose weight despite gym  Acne too    Had BTL        Wt Readings from Last 3 Encounters:   01/02/20 185 lb (83.9 kg)   05/07/19 186 lb 6.4 oz (84.6 kg)   04/10/19 187 lb (84.8 kg)         ROS:    Pt denies: Wt loss, Fever/Chills, HA, Visual changes, Fatigue, Chest pain, SOB, KEEN, Abd pain, N/V/D/C, Blood in stool or urine, Edema. Pertinent positive as above in HPI. All others were negative    Patient Active Problem List   Diagnosis Code    Hypothyroidism due to acquired atrophy of thyroid E03.4    Anxiety F41.9       Past Medical History:   Diagnosis Date    Anxiety     Depression 1 year    anxiety     Thyroid disease            Social History     Tobacco Use   Smoking Status Never Smoker   Smokeless Tobacco Never Used       No Known Allergies    Patient Labs were reviewed: yes      Patient Past Records were reviewed:  yes        Objective:     Vitals:    01/02/20 1321   BP: 107/56   Pulse: 92   Resp: 12   SpO2: 100%   Weight: 185 lb (83.9 kg)   Height: 5' 2\" (1.575 m)     Body mass index is 33.84 kg/m². Exam:   Appearance: alert, well appearing,  oriented to person, place, and time, acyanotic, in no respiratory distress and well hydrated.   HEENT:  NC/AT, pink conj, anicteric sclerae  Neck:  No cervical lymphadenopathy, no JVD, no thyromegaly, no carotid bruit  Heart:  RRR without M/R/G  Lungs:  CTAB, no rhonchi, rales, or wheezes with good air exchange   Abdomen:  Non-tender, pos bowel sounds, no hepatosplenomegaly  Ext:  No C/C/E    Skin: no rash, acne on the face/chin; facial hair noted  Neuro: no lateralizing signs, CNs II-XII intact      Assessment/ Plan:   Diagnoses and all orders for this visit:    1. Hypothyroidism due to acquired atrophy of thyroid  -     levothyroxine (SYNTHROID) 25 mcg tablet; Take 1 Tab by mouth Daily (before breakfast). 2. Acne, unspecified acne type  -     benzoyl peroxide-erythromycin (BENZAMYCIN) 3-5 % topical gel; Apply  to affected area two (2) times a day. 3. Hirsutism-will see if treating the hypothyroidism will ease this; considered Spironolactone but BP on the low side    4. Class 1 obesity due to excess calories without serious comorbidity with body mass index (BMI) of 32.0 to 32.9 in adult-discussed limiting nicholas to 5711-7625/day and exercising at least 150 min a week    5. Encounter for immunization  -     INFLUENZA VIRUS VAC QUAD,SPLIT,PRESV FREE SYRINGE IM      Follow-up and Dispositions    · Return in about 1 month (around 2/2/2020) for follow up. I have discussed the diagnosis with the patient and the intended plan as seen in the above orders. The patient has received an After-Visit Summary and questions were answered concerning future plans. Medication Side Effects and Warnings were discussed with patient: yes    Patient verbalized understanding of above instructions.     Day Noel MD  Internal Medicine  [facility]

## 2020-02-20 ENCOUNTER — OFFICE VISIT (OUTPATIENT)
Dept: FAMILY MEDICINE CLINIC | Age: 35
End: 2020-02-20

## 2020-02-20 VITALS
DIASTOLIC BLOOD PRESSURE: 82 MMHG | TEMPERATURE: 97.8 F | SYSTOLIC BLOOD PRESSURE: 126 MMHG | OXYGEN SATURATION: 99 % | WEIGHT: 183 LBS | BODY MASS INDEX: 33.68 KG/M2 | RESPIRATION RATE: 15 BRPM | HEIGHT: 62 IN | HEART RATE: 82 BPM

## 2020-02-20 DIAGNOSIS — L68.0 HIRSUTISM: ICD-10-CM

## 2020-02-20 DIAGNOSIS — E66.09 CLASS 1 OBESITY DUE TO EXCESS CALORIES WITHOUT SERIOUS COMORBIDITY WITH BODY MASS INDEX (BMI) OF 32.0 TO 32.9 IN ADULT: ICD-10-CM

## 2020-02-20 DIAGNOSIS — K64.9 HEMORRHOIDS, UNSPECIFIED HEMORRHOID TYPE: ICD-10-CM

## 2020-02-20 DIAGNOSIS — L65.9 HAIR LOSS: ICD-10-CM

## 2020-02-20 DIAGNOSIS — L70.9 ACNE, UNSPECIFIED ACNE TYPE: ICD-10-CM

## 2020-02-20 DIAGNOSIS — E03.4 HYPOTHYROIDISM DUE TO ACQUIRED ATROPHY OF THYROID: Primary | ICD-10-CM

## 2020-02-20 DIAGNOSIS — L72.0 EPIDERMAL CYST OF FACE: ICD-10-CM

## 2020-02-20 RX ORDER — HYDROCORTISONE 25 MG/G
CREAM TOPICAL
Qty: 30 G | Refills: 1 | Status: SHIPPED | OUTPATIENT
Start: 2020-02-20 | End: 2020-05-21

## 2020-02-20 NOTE — PROGRESS NOTES
Chief Complaint   Patient presents with    Follow-up     1 month Thyroid       Pt is a 36y.o. year old female who presents for follow up of her chronic medical problems    Health Maintenance Due   Topic Date Due    PAP AKA CERVICAL CYTOLOGY -after her baby in 2018, [facility] 03/21[facility]       Lab Results   Component Value Date/Time    TSH 2.660 02/20/2020 10:20 AM   On Synthroid started last visit-not helping a lot  Hair is still falling  Hair growing on the chin  Endo appt?prev saw Dr Fabrizio Perez; needs a referral    Wt Readings from Last 3 Encounters:   02/20/20 183 lb (83 kg)   01/02/20 185 lb (83.9 kg)   05/07/19 186 lb 6.4 oz (84.6 kg)   has been trying to lose weight    Hirsutism-still a problem  Menses regular    Acne-on Benzamycin but back ordered  Still gets breakouts    Hemorrhoids-after having her baby  Tried all OTC meds  No constipation    ROS:    Pt denies: Wt loss, Fever/Chills, HA, Visual changes, Fatigue, Chest pain, SOB, KEEN, Abd pain, N/V/D/C, Blood in stool or urine, Edema. Pertinent positive as above in HPI. All others were negative    Patient Active Problem List   Diagnosis Code    Hypothyroidism due to acquired atrophy of thyroid E03.4    Anxiety F41.9       Past Medical History:   Diagnosis Date    Anxiety     Depression 1 year    anxiety     Thyroid disease        Current Outpatient Medications   Medication Sig Dispense Refill    levothyroxine (SYNTHROID) 25 mcg tablet Take 1 Tab by mouth Daily (before breakfast). 30 Tab 4       Social History     Tobacco Use   Smoking Status Never Smoker   Smokeless Tobacco Never Used       No Known Allergies    Patient Labs were reviewed: yes      Patient Past Records were reviewed:  yes        Objective:     Vitals:    02/20/20 0919   BP: 126/82   Pulse: 82   Resp: 15   Temp: 97.8 °F (36.6 °C)   TempSrc: Oral   SpO2: 99%   Weight: 183 lb (83 kg)   Height: 5' 2\" (1.575 m)     Body mass index is 33.47 kg/m².     Exam:   Appearance: alert, well appearing,  oriented to person, place, and time, acyanotic, in no respiratory distress and well hydrated. HEENT:  NC/AT, pink conj, anicteric sclerae  Neck:  No cervical lymphadenopathy, no JVD, no thyromegaly, no carotid bruit  Heart:  RRR without M/R/G  Lungs:  CTAB, no rhonchi, rales, or wheezes with good air exchange   Abdomen:  Non-tender, pos bowel sounds, no hepatosplenomegaly  Ext:  No C/C/E    Skin: no rash, acne and facial hair noted; subcutaneous lesion on the right cheek  Neuro: no lateralizing signs, CNs II-XII intact  Scalp: no bald patches noted    Assessment/ Plan:   Diagnoses and all orders for this visit:    1. Hypothyroidism due to acquired atrophy of thyroid-will adjust Synthroid dose once TSh is back and until she sees Endo  -     TSH 3RD GENERATION; Future  -     REFERRAL TO ENDOCRINOLOGY    2. Hair loss-etio? Starting her on thyroid med did not help; denies undue stress  -     CBC WITH AUTOMATED DIFF; Future  -     FERRITIN; Future  -     REFERRAL TO ENDOCRINOLOGY    3. Acne, unspecified acne type  -     CBC WITH AUTOMATED DIFF; Future  -     REFERRAL TO DERMATOLOGY    4. Hirsutism  -     METABOLIC PANEL, COMPREHENSIVE; Future  -     TESTOSTERONE, TOTAL; Future  -     REFERRAL TO ENDOCRINOLOGY    5. Epidermal cyst of face  -     REFERRAL TO DERMATOLOGY    6. Hemorrhoids, unspecified hemorrhoid type  -     hydrocortisone (PROCTOSOL HC) 2.5 % rectal cream; Insert  into rectum two (2) times daily as needed for Hemorrhoids. 7. Class 1 obesity due to excess calories without serious comorbidity with body mass index (BMI) of 32.0 to 32.9 in adult-discussed limiting nicholas to 0712-2410/day and exercising at least 150 min a week  -     LIPID PANEL; Future      Follow-up and Dispositions    · Return in about 3 months (around 5/20/2020) for follow up. I have discussed the diagnosis with the patient and the intended plan as seen in the above orders.   The patient has received an After-Visit Summary and questions were answered concerning future plans. Medication Side Effects and Warnings were discussed with patient: yes    Patient verbalized understanding of above instructions.     Renée Forman MD  Internal Medicine  [facility]

## 2020-02-20 NOTE — PATIENT INSTRUCTIONS
Learning About Hirsutism  What is hirsutism? Hirsutism (say \"HER-shruti-tiz-um\") is excess hair on a woman's face or body. It can run in a woman's family. Most of the time, hirsutism is not caused by a medical problem. But once in a while, hirsutism can be a sign of a health problem. What are the symptoms? Symptoms of hirsutism include extra hair that grows on a woman's face, like it does on a man's face. Or it grows on the body, especially the chest and back. The hair is dark and coarse. What causes hirsutism? Hirsutism is common in women who have polycystic ovary syndrome (PCOS). This syndrome affects your hormone balance, ovulation, and menstrual periods. In some women, hirsutism may be caused by higher-than-normal levels of male hormones called androgens. These hormones are found in both men and women, though men have a lot more of them. In women, androgens are produced by the ovaries or the adrenal glands. But some women with hirsutism don't have PCOS or any other cause that can be found. Their hormone levels are normal, and so are their menstrual cycles. These women may have been born with hair follicles that are more sensitive to androgens. Hirsutism may also occur in some women who have diabetes or who are obese. In rare cases, the ovaries or adrenal glands may have a problem that can cause this hair growth. How is it treated? Your doctor may want to do some tests to find out if a medical problem is causing your excess hair growth. If the cause is not a medical problem, treating it is often a matter of choice. Treatments include:  · Birth control pills. This is the most common treatment. Birth control pills contain hormones, so they help balance your body's hormone level. · Antiandrogens. These are prescription medicines that lower the amount of certain hormones in your body. · Topical cream. Your doctor may prescribe a cream that you rub into affected areas to slow hair growth.   · Laser hair removal. This procedure uses laser treatments to heat and destroy hair follicles. Hair can be removed for good, but it may take many treatments. · Electrolysis. An electric current is applied to the hair root. This is also permanent, and it may take many treatments. It can also cause scars. If your doctor prescribed medicines, take them as directed. Be safe with medicines. Call your doctor if you think you are having a problem with your medicine. Women who have PCOS and who are overweight may be able to reduce excess hair growth by reaching a healthy weight. Home care  Some women prefer to use home treatments for unwanted hair. These treatments include:  · Using depilatories. These are over-the-counter creams that dissolve hair. They may irritate the skin. Hair growth returns. · Waxing. This treatment pulls the hair out by the root. Repeated waxing may result in less hair growth, but it can be painful and may irritate the skin. · Shaving. Shaving does not increase hair growth, but it can cause stubble. · Tweezing. This takes a lot of time and can be painful. · Bleaching. Bleaching makes hair lighter and harder to see. New hair that grows in will be its natural color. Follow-up care is a key part of your treatment and safety. Be sure to make and go to all appointments, and call your doctor if you are having problems. It's also a good idea to know your test results and keep a list of the medicines you take. Where can you learn more? Go to http://nima-hank.info/. Enter D711 in the search box to learn more about \"Learning About Hirsutism. \"  Current as of: April 1, 2019  Content Version: 12.2  © 7910-4671 Healthwise, Incorporated. Care instructions adapted under license by [employer] (which disclaims liability or warranty for this information).  If you have questions about a medical condition or this instruction, always ask your healthcare professional. [employer] disclaims any warranty or liability for your use of this information.

## 2020-02-20 NOTE — PROGRESS NOTES
Chief Complaint   Patient presents with    Follow-up     1 month Thyroid     1. Have you been to the ER, urgent care clinic since your last visit? Hospitalized since your last visit? No    2. Have you seen or consulted any other health care providers outside of the [facility] since your last visit? Include any pap smears or colon screening.  No

## 2020-02-21 LAB
ALBUMIN SERPL-MCNC: 4.6 G/DL (ref 3.8–4.8)
ALBUMIN/GLOB SERPL: 2.1 {RATIO} (ref 1.2–2.2)
ALP SERPL-CCNC: 73 IU/L (ref 39–117)
ALT SERPL-CCNC: 15 IU/L (ref 0–32)
AST SERPL-CCNC: 12 IU/L (ref 0–40)
BASOPHILS # BLD AUTO: 0.1 X10E3/UL (ref 0–0.2)
BASOPHILS NFR BLD AUTO: 1 %
BILIRUB SERPL-MCNC: 0.3 MG/DL (ref 0–1.2)
BUN SERPL-MCNC: 10 MG/DL (ref 6–20)
BUN/CREAT SERPL: 16 (ref 9–23)
CALCIUM SERPL-MCNC: 9.6 MG/DL (ref 8.7–10.2)
CHLORIDE SERPL-SCNC: 102 MMOL/L (ref 96–106)
CHOLEST SERPL-MCNC: 132 MG/DL (ref 100–199)
CO2 SERPL-SCNC: 24 MMOL/L (ref 20–29)
CREAT SERPL-MCNC: 0.62 MG/DL (ref 0.57–1)
EOSINOPHIL # BLD AUTO: 0.2 X10E3/UL (ref 0–0.4)
EOSINOPHIL NFR BLD AUTO: 2 %
ERYTHROCYTE [DISTWIDTH] IN BLOOD BY AUTOMATED COUNT: 12.3 % (ref 11.7–15.4)
FERRITIN SERPL-MCNC: 43 NG/ML (ref 15–150)
GLOBULIN SER CALC-MCNC: 2.2 G/DL (ref 1.5–4.5)
GLUCOSE SERPL-MCNC: 79 MG/DL (ref 65–99)
HCT VFR BLD AUTO: 41.6 % (ref 34–46.6)
HDLC SERPL-MCNC: 40 MG/DL
HGB BLD-MCNC: 13.6 G/DL (ref 11.1–15.9)
IMM GRANULOCYTES # BLD AUTO: 0 X10E3/UL (ref 0–0.1)
IMM GRANULOCYTES NFR BLD AUTO: 0 %
INTERPRETATION, 910389: NORMAL
LDLC SERPL CALC-MCNC: 79 MG/DL (ref 0–99)
LYMPHOCYTES # BLD AUTO: 3.3 X10E3/UL (ref 0.7–3.1)
LYMPHOCYTES NFR BLD AUTO: 34 %
MCH RBC QN AUTO: 29.4 PG (ref 26.6–33)
MCHC RBC AUTO-ENTMCNC: 32.7 G/DL (ref 31.5–35.7)
MCV RBC AUTO: 90 FL (ref 79–97)
MONOCYTES # BLD AUTO: 0.5 X10E3/UL (ref 0.1–0.9)
MONOCYTES NFR BLD AUTO: 6 %
NEUTROPHILS # BLD AUTO: 5.6 X10E3/UL (ref 1.4–7)
NEUTROPHILS NFR BLD AUTO: 57 %
PLATELET # BLD AUTO: 359 X10E3/UL (ref 150–450)
POTASSIUM SERPL-SCNC: 4.5 MMOL/L (ref 3.5–5.2)
PROT SERPL-MCNC: 6.8 G/DL (ref 6–8.5)
RBC # BLD AUTO: 4.62 X10E6/UL (ref 3.77–5.28)
SODIUM SERPL-SCNC: 141 MMOL/L (ref 134–144)
TRIGL SERPL-MCNC: 66 MG/DL (ref 0–149)
TSH SERPL DL<=0.005 MIU/L-ACNC: 2.66 UIU/ML (ref 0.45–4.5)
VLDLC SERPL CALC-MCNC: 13 MG/DL (ref 5–40)
WBC # BLD AUTO: 9.7 X10E3/UL (ref 3.4–10.8)

## 2020-03-09 ENCOUNTER — TELEPHONE (OUTPATIENT)
Dept: FAMILY MEDICINE CLINIC | Age: 35
End: 2020-03-09

## 2020-03-09 DIAGNOSIS — E03.4 HYPOTHYROIDISM DUE TO ACQUIRED ATROPHY OF THYROID: Primary | ICD-10-CM

## 2020-03-09 RX ORDER — LEVOTHYROXINE SODIUM 50 UG/1
50 TABLET ORAL
Qty: 90 TAB | Refills: 1 | Status: SHIPPED | OUTPATIENT
Start: 2020-03-09 | End: 2021-06-16

## 2020-03-09 NOTE — TELEPHONE ENCOUNTER
Patient called stating that a prescription was suppose to be sent for her synthroid on 2/20/2020.  Patient requested a call back

## 2020-03-24 ENCOUNTER — OFFICE VISIT (OUTPATIENT)
Dept: FAMILY MEDICINE CLINIC | Age: 35
End: 2020-03-24

## 2020-03-24 VITALS
TEMPERATURE: 98.1 F | SYSTOLIC BLOOD PRESSURE: 115 MMHG | WEIGHT: 183.2 LBS | BODY MASS INDEX: 33.71 KG/M2 | HEART RATE: 78 BPM | HEIGHT: 62 IN | RESPIRATION RATE: 15 BRPM | DIASTOLIC BLOOD PRESSURE: 79 MMHG | OXYGEN SATURATION: 98 %

## 2020-03-24 DIAGNOSIS — E66.09 CLASS 1 OBESITY DUE TO EXCESS CALORIES WITHOUT SERIOUS COMORBIDITY WITH BODY MASS INDEX (BMI) OF 32.0 TO 32.9 IN ADULT: ICD-10-CM

## 2020-03-24 DIAGNOSIS — E03.4 HYPOTHYROIDISM DUE TO ACQUIRED ATROPHY OF THYROID: ICD-10-CM

## 2020-03-24 DIAGNOSIS — K64.4 EXTERNAL HEMORRHOIDS: Primary | ICD-10-CM

## 2020-03-24 NOTE — PATIENT INSTRUCTIONS
Learning About Rubber Band Ligation for Hemorrhoids  What is rubber band ligation? Rubber band ligation treats hemorrhoids. Hemorrhoids are swollen veins in the rectal area. In most cases, this procedure can be done in the doctor's office. Your doctor can treat one or two hemorrhoids at a time. This treatment is only for internal hemorrhoids. How is this procedure done? Your doctor will insert a viewing instrument (anoscope) into your anus. The hemorrhoid is grasped with an instrument, and a device places a rubber band around the base of the hemorrhoid. This stops blood flow to the hemorrhoids. The hemorrhoids shrink and fall off 7 to 10 days after the procedure. You will be awake during the procedure. It takes about 30 minutes. You may feel some discomfort. You will be able to go home right after the procedure. What can you expect after the procedure? After the procedure, you may feel pain and have a feeling of fullness in your lower belly, or you may feel as if you need to have a bowel movement. This usually goes away after several days. You may need pain medicine during this time. You may have a small amount of bleeding from your anus about 7 to 10 days after surgery, when your hemorrhoid falls off. This is normal.  Some people are able to return to regular activities right away. Others may need to take a few days off work. You will need to avoid heavy lifting and straining with bowel movements while you recover. Follow-up care is a key part of your treatment and safety. Be sure to make and go to all appointments, and call your doctor if you are having problems. It's also a good idea to know your test results and keep a list of the medicines you take. Where can you learn more? Go to http://nima-hank.info/  Enter I939 in the search box to learn more about \"Learning About Rubber Band Ligation for Hemorrhoids. \"  Current as of: August 11, 2019Content Version: 12.4  © 9746-3457 Healthwise, Incorporated. Care instructions adapted under license by [employer] (which disclaims liability or warranty for this information). If you have questions about a medical condition or this instruction, always ask your healthcare professional. [place] [address] any warranty or liability for your use of this information.

## 2020-03-24 NOTE — PROGRESS NOTES
Chief Complaint   Patient presents with    Hemorrhoids     Protosol not working       Pt is a 36y.o. year old female who presents for an acute visit for the above     hx of hemorrhoids since she had her daughter who is now 2  \"Would just not go away\"  Painful and is always out  Denies constipation but hard to go to the bathroom bec of her not wanting to go because of pain  Not bleeding but it has bled at times-BRBPR    Proctosol HC rx -has been using it BID for over a week now  OTC wipes, sitz baths; home remedies from online but hemorrhoids still there    Wt Readings from Last 3 Encounters:   03/24/20 183 lb 3.2 oz (83.1 kg)   02/20/20 183 lb (83 kg)   01/02/20 185 lb (83.9 kg)   feels a bit better on increased dose of thyroid med-not as sluggish  Has Endo appt coming up this week      ROS:    Pt denies: Wt loss, Fever/Chills, HA, Visual changes, Fatigue, Chest pain, SOB, KEEN, Abd pain, N/V/D/C, Blood in stool or urine, Edema. Pertinent positive as above in HPI. All others were negative    Patient Active Problem List   Diagnosis Code    Hypothyroidism due to acquired atrophy of thyroid E03.4    Anxiety F41.9       Past Medical History:   Diagnosis Date    Anxiety     Depression 1 year    anxiety     Thyroid disease        Current Outpatient Medications   Medication Sig Dispense Refill    levothyroxine (SYNTHROID) 50 mcg tablet Take 1 Tab by mouth Daily (before breakfast). 90 Tab 1    hydrocortisone (PROCTOSOL HC) 2.5 % rectal cream Insert  into rectum two (2) times daily as needed for Hemorrhoids.  30 g 1       Social History     Tobacco Use   Smoking Status Never Smoker   Smokeless Tobacco Never Used       No Known Allergies    Patient Labs were reviewed: yes      Patient Past Records were reviewed:  yes        Objective:     Vitals:    03/24/20 1051   BP: 115/79   Pulse: 78   Resp: 15   Temp: 98.1 °F (36.7 °C)   TempSrc: Oral   SpO2: 98%   Weight: 183 lb 3.2 oz (83.1 kg)   Height: 5' 2\" (1.575 m)     Body mass index is 33.51 kg/m². Exam:   Appearance: alert, well appearing,  oriented to person, place, and time, acyanotic, in no respiratory distress and well hydrated. HEENT:  NC/AT, pink conj, anicteric sclerae  Neck:  No cervical lymphadenopathy, no JVD, no thyromegaly, no carotid bruit  Heart:  RRR without M/R/G  Lungs:  CTAB, no rhonchi, rales, or wheezes with good air exchange   Abdomen:  Non-tender, pos bowel sounds, no hepatosplenomegaly  Ext:  No C/C/E    Skin: no rash  Neuro: no lateralizing signs, CNs II-XII intact  Rectal exam: 4 non thrombosed external hemorrhoids noted    Assessment/ Plan:   Diagnoses and all orders for this visit:    1. External hemorrhoids-trial of steroid rectal cream for over a week and OTC remedies not effective  -      NIFEDIPINE 0.2 % RECTAL OINTMENT COMPOUND; Apply to anus three times daily.  -     REFERRAL TO GASTROENTEROLOGY for possible banding    2. Hypothyroidism due to acquired atrophy of thyroid-continue with higher dose of Synthroid and keep appt with Endo    3. Class 1 obesity due to excess calories without serious comorbidity with body mass index (BMI) of 32.0 to 32.9 in adult-discussed limiting nicholas to 7725-2459/day and exercising at least 150 min a week        Follow-up and Dispositions    · Return for previous appt, follow up. I have discussed the diagnosis with the patient and the intended plan as seen in the above orders. The patient has received an After-Visit Summary and questions were answered concerning future plans. Medication Side Effects and Warnings were discussed with patient: yes    Patient verbalized understanding of above instructions.     Karin Holter, MD  Internal Medicine  [facility]

## 2020-03-24 NOTE — PROGRESS NOTES
Chief Complaint   Patient presents with    Hemorrhoids     Protosol not working     1. Have you been to the ER, urgent care clinic since your last visit? Hospitalized since your last visit? No    2. Have you seen or consulted any other health care providers outside of the [address] [place] [address] since your last visit? Include any pap smears or colon screening.  Dermatolgy visit

## 2020-05-21 ENCOUNTER — VIRTUAL VISIT (OUTPATIENT)
Dept: FAMILY MEDICINE CLINIC | Age: 35
End: 2020-05-21

## 2020-05-21 DIAGNOSIS — K64.4 EXTERNAL HEMORRHOIDS: ICD-10-CM

## 2020-05-21 DIAGNOSIS — E66.9 OBESITY (BMI 30.0-34.9): ICD-10-CM

## 2020-05-21 DIAGNOSIS — E03.4 HYPOTHYROIDISM DUE TO ACQUIRED ATROPHY OF THYROID: Primary | ICD-10-CM

## 2020-05-21 DIAGNOSIS — L68.0 HIRSUTISM: ICD-10-CM

## 2020-05-21 DIAGNOSIS — L70.9 ACNE, UNSPECIFIED ACNE TYPE: ICD-10-CM

## 2020-05-21 RX ORDER — PHENTERMINE HYDROCHLORIDE 30 MG/1
30 CAPSULE ORAL DAILY
COMMUNITY
Start: 2020-03-26 | End: 2022-01-13

## 2020-05-21 RX ORDER — SPIRONOLACTONE 25 MG/1
25 TABLET ORAL DAILY
COMMUNITY
Start: 2020-03-10 | End: 2022-01-13

## 2020-05-21 NOTE — PATIENT INSTRUCTIONS
Rubber Band Ligation for Hemorrhoids: Before Your Procedure What is rubber band ligation? Rubber band ligation treats hemorrhoids. Hemorrhoids are swollen veins in the rectal area. This treatment is for only internal hemorrhoids. To do the procedure, your doctor puts a special viewing tool into your anus. This tool is called an anoscope. The doctor then uses other tools to grab the hemorrhoid and put a rubber band around it. The band stops the blood flow. This causes the hemorrhoids to shrink and fall off in 7 to 10 days. In most cases, this procedure is done in the doctor's office. Your doctor can treat one or two hemorrhoids at a time. More hemorrhoids can be treated if you are asleep during the procedure. The procedure takes about 30 minutes. You can go home when it's done. Some people are able to return to regular activities right away. Others may need to take a few days off from work. Make sure not to lift anything heavy until you heal. It's also important not to strain when you have a bowel movement. Follow-up care is a key part of your treatment and safety. Be sure to make and go to all appointments, and call your doctor if you are having problems. It's also a good idea to know your test results and keep a list of the medicines you take. What happens before the procedure? 
 Preparing for the procedure 
  · Understand exactly what procedure is planned, along with the risks, benefits, and other options. · Tell your doctors ALL the medicines, vitamins, supplements, and herbal remedies you take. Some of these can increase the risk of bleeding or interact with anesthesia.  
  · If you take aspirin or some other blood thinner, ask your doctor if you should stop taking it before your procedure. Make sure that you understand exactly what your doctor wants you to do.  These medicines increase the risk of bleeding.  
  · Your doctor will tell you which medicines to take or stop before your procedure. You may need to stop taking certain medicines a week or more before the procedure. So talk to your doctor as soon as you can.  
  · If you have an advance directive, let your doctor know. It may include a living will and a durable power of  for health care. Bring a copy to the hospital. If you don't have one, you may want to prepare one. It lets your doctor and loved ones know your health care wishes. Doctors advise that everyone prepare these papers before any type of surgery or procedure.  
  · You may need to empty your colon with an enema or laxative. Your doctor will tell you how to do this. Procedures can be stressful. This information will help you understand what you can expect. And it will help you safely prepare for your procedure. What happens on the day of the procedure? · Follow the instructions exactly about when to stop eating and drinking. If you don't, your procedure may be canceled. If your doctor told you to take your medicines on the day of the procedure, take them with only a sip of water.  
  · Take a bath or shower before you come in for your procedure. Do not apply lotions, perfumes, deodorants, or nail polish.  
  · Take off all jewelry and piercings. And take out contact lenses, if you wear them.  
 At the doctor's office · Bring a picture ID.  
  · You will be kept comfortable and safe by your anesthesia provider.  
  · The procedure will take about 30 minutes.  
  · You will be able to go home right after the procedure. Going home · Be sure you have someone to drive you home. Anesthesia and pain medicine make it unsafe for you to drive.  
  · You will be given more specific instructions about recovering from your procedure. They will cover things like diet, wound care, follow-up care, driving, and getting back to your normal routine. When should you call your doctor? · You have questions or concerns.   · You don't understand how to prepare for your procedure.  
  · You become ill before the procedure (such as fever, flu, or a cold).  
  · You need to reschedule or have changed your mind about having the procedure. Where can you learn more? Go to http://nima-hank.info/ Enter K667 in the search box to learn more about \"Rubber Band Ligation for Hemorrhoids: Before Your Procedure. \" Current as of: August 11, 2019Content Version: 12.4 © 0611-7150 Healthwise, Incorporated. Care instructions adapted under license by [employer] (which disclaims liability or warranty for this information). If you have questions about a medical condition or this instruction, always ask your healthcare professional. [address] any warranty or liability for your use of this information.

## 2020-05-21 NOTE — PROGRESS NOTES
Michelle Foss is a 36 y.o. female who was seen by synchronous (real-time) audio-video technology on 5/21/2020. Consent: Michelle Foss, who was seen by synchronous (real-time) audio-video technology, and/or her healthcare decision maker, is aware that this patient-initiated, Telehealth encounter on 5/21/2020 is a billable service, with coverage as determined by her insurance carrier. She is aware that she may receive a bill and has provided verbal consent to proceed: Yes. Assessment & Plan:   Diagnoses and all orders for this visit:    1. Hypothyroidism due to acquired atrophy of thyroid-continue with Levothyroxine    2. External hemorrhoids-s/p banding by GI    3. Hirsutism-started on Aldactone by Endo (low dose though)    4. Obesity (BMI 30.0-34. 9)-discussed limiting nicholas to 0839-0792/day and exercising at least 150 min a week; discussed possible elevation of BP with Phentermine started by Endo/advised to check BP at home    5. Acne, unspecified acne type-also started on Spironolactone by Derm        Follow-up and Dispositions    · Return in about 3 months (around 8/21/2020) for follow up, pap smear. 712  Subjective:   Michelle Foss is a 36 y.o. female who was seen for Follow Up Chronic Condition and Other (HM Due: Pap)  since last visit, saw endo, note reviewed  Thyra Homans, MD - 03/26/2020 3:30 PM EDT  Formatting of this note might be different from the original.  [facility] [address] of Endocrinology  [address] [place] [address]  Telephone: [phone] Fax: [phone]  CONSULTATION NOTE  REQUESTING PHYSICIAN: Ayla Mullen MD  REASON FOR CONSULTATION: Management of Hypothyroidism  Assessment:   Hypothyroidism,  Obesity,  Hirsutism  PCOS ? [place] Plan:   Her thyroid test is good and asked to stay on the current dose of Levothyroxine. She was started on Aldactone for Hirsutism. She was explained the risk, benefit and side effects.   Asked to contact me if she develops any significant side effects. With regard to pt's weight issue. Pt was advised about diet and exercise to loose weight. Pt has tried comprehensive regime of weight reduction, which includes exercise, dietary and behavior modification. We discussed about weight loss medications. I recommend to try Phentermine which is affordable. Started on 30 mg daily. Pt was explained that Phentermine should only be used as a short-term adjunct in obese patients (defined as those with Body Mass Index (BMI) >30) if they fails above steps and no adequate clinical response. Pt understand this well. Risk, benefit and side effects of Phentermine were discusses. Asked to contact me if she develops any of those. Addiction potentials were discussed. Follow up: 6 months and as needed. \"    Per patient, she will see Endo back in 3 months bec of the Phentermine rx    Wt Readings from Last 3 Encounters:   03/24/20 183 lb 3.2 oz (83.1 kg)   02/20/20 183 lb (83 kg)   01/02/20 185 lb (83.9 kg)   175 lbs now    Ext Hemorrhoids-had banding done 2x and another one next week; done downstairs  No further bleeding    Derm for spot on the face, acne-Spironolactone, biopsied and was negative      Health Maintenance Due   Topic Date Due    PAP AKA CERVICAL CYTOLOGY -not in a while, complete women's care 03/21/2014       Prior to Admission medications    Medication Sig Start Date End Date Taking? Authorizing Provider   phentermine 30 mg capsule Take 30 mg by mouth daily. 3/26/20  Yes Rolando Gaspar MD   spironolactone (ALDACTONE) 25 mg tablet Take 25 mg by mouth daily. 3/10/20  Yes Rolando Gaspar MD   levothyroxine (SYNTHROID) 50 mcg tablet Take 1 Tab by mouth Daily (before breakfast). 3/9/20  Yes Danica Sepulveda MD   RX AMB NIFEDIPINE 0.2 % RECTAL OINTMENT COMPOUND Apply to anus three times daily.  3/24/20 5/21/20  Alondra Gustafson MD   hydrocortisone (PROCTOSOL HC) 2.5 % rectal cream Insert  into rectum two (2) times daily as needed for Hemorrhoids. 2/20/20 5/21/20  Errol Figueroa MD     No Known Allergies    Patient Active Problem List    Diagnosis Date Noted    Hirsutism 05/21/2020    Obesity (BMI 30.0-34.9) 05/21/2020    Hypothyroidism due to acquired atrophy of thyroid 07/20/2016    Anxiety 11/13/2013       ROS  Pt denies: Wt loss, Fever/Chills, HA, Visual changes, Fatigue, Chest pain, SOB, KEEN, Abd pain, N/V/D/C, Blood in stool or urine, Edema. Pertinent positive as above in HPI. All others were negative    Objective: There were no vitals taken for this visit. General: alert, cooperative, no distress   Mental  status: normal mood, behavior, speech, dress, motor activity, and thought processes, able to follow commands   HENT: NCAT   Neck: no visualized mass   Resp: no respiratory distress   Neuro: no gross deficits   Skin: no discoloration or lesions of concern on visible areas; still with acne   Psychiatric: normal affect, consistent with stated mood, no evidence of hallucinations     Additional exam findings: weight loss is apparent      We discussed the expected course, resolution and complications of the diagnosis(es) in detail. Medication risks, benefits, costs, interactions, and alternatives were discussed as indicated. I advised her to contact the office if her condition worsens, changes or fails to improve as anticipated. She expressed understanding with the diagnosis(es) and plan. Zeus Stanford is a 36 y.o. female who was evaluated by a video visit encounter for concerns as above. Patient identification was verified prior to start of the visit. A caregiver was present when appropriate. Due to this being a TeleHealth encounter (During [facility] public health emergency), evaluation of the following organ systems was limited: Vitals/Constitutional/EENT/Resp/CV/GI//MS/Neuro/Skin/Heme-Lymph-Imm.   Pursuant to the emergency declaration under the [address] Emergencies Act, 1135 waiver authority and the Coronavirus Preparedness and Response Supplemental Appropriations Act, this Virtual  Visit was conducted, with patient's (and/or legal guardian's) consent, to reduce the patient's risk of exposure to COVID-19 and provide necessary medical care. Services were provided through a video synchronous discussion virtually to substitute for in-person clinic visit. Patient and provider were located at their individual homes.       Santhosh Hernandez MD

## 2020-05-21 NOTE — PROGRESS NOTES
1. Have you been to the ER, urgent care clinic since your last visit? Hospitalized since your last visit? No    2. Have you seen or consulted any other health care providers outside of the [address] [place] [address] since your last visit? Include any pap smears or colon screening.  No

## 2020-06-16 ENCOUNTER — VIRTUAL VISIT (OUTPATIENT)
Dept: FAMILY MEDICINE CLINIC | Age: 35
End: 2020-06-16

## 2020-06-16 DIAGNOSIS — N76.0 ACUTE VAGINITIS: Primary | ICD-10-CM

## 2020-06-16 DIAGNOSIS — Z91.89 AT RISK FOR SEXUALLY TRANSMITTED DISEASE DUE TO UNPROTECTED SEX: ICD-10-CM

## 2020-06-16 RX ORDER — AZITHROMYCIN 1 G/1
1 POWDER, FOR SUSPENSION ORAL
Qty: 1 PACKET | Refills: 0 | Status: SHIPPED | OUTPATIENT
Start: 2020-06-16 | End: 2020-06-16

## 2020-06-16 NOTE — PATIENT INSTRUCTIONS
Vaginitis: Care Instructions Your Care Instructions Vaginitis is soreness or infection of the vagina. This common problem can cause itching and burning. And it can cause a change in vaginal discharge. Sometimes it can cause pain during sex. Vaginitis may be caused by bacteria, yeast, or other germs. Some infections that cause it are caught from a sexual partner. Bath products, spermicides, and douches can irritate the vagina too. Some women have this problem during and after menopause. A drop in estrogen levels during this time can cause dryness, soreness, and pain during sex. Your doctor can give you medicine to treat an infection. And home care may help you feel better. For certain types of infections, your sex partner must be treated too. Follow-up care is a key part of your treatment and safety. Be sure to make and go to all appointments, and call your doctor if you are having problems. It's also a good idea to know your test results and keep a list of the medicines you take. How can you care for yourself at home? · If your doctor prescribed antibiotics, take them as directed. Do not stop taking them just because you feel better. You need to take the full course of antibiotics. · Take your medicines exactly as prescribed. Call your doctor if you think you are having a problem with your medicine. · Do not eat or drink anything that has alcohol if you are taking metronidazole (Flagyl). · If you have a yeast infection, use over-the-counter products as your doctor tells you to. Or take medicine your doctor prescribes exactly as directed. · Wash your vaginal area daily with water. You also can use a mild, unscented soap if you want. · Do not use scented bath products. And do not use vaginal sprays or douches. · Put a washcloth soaked in cool water on the area to relieve itching. Or you can take cool baths.  
· If you have dryness because of menopause, use estrogen cream or pills that your doctor prescribes. · Ask your doctor about when it is okay to have sex. · Use a personal lubricant before sex if you have dryness. Examples are Astroglide, K-Y Jelly, and Wet Lubricant Gel. · Ask your doctor if your sex partner also needs treatment. When should you call for help? Call your doctor now or seek immediate medical care if: 
· You have a fever and pelvic pain. Watch closely for changes in your health, and be sure to contact your doctor if: 
· You have bleeding other than your period. · You do not get better as expected. Where can you learn more? Go to http://nima-hank.info/ Enter V447 in the search box to learn more about \"Vaginitis: Care Instructions. \" Current as of: November 8, 2019               Content Version: 12.5 © 3058-2434 [employer]. Care instructions adapted under license by [employer] (which disclaims liability or warranty for this information). If you have questions about a medical condition or this instruction, always ask your healthcare professional. [address] any warranty or liability for your use of this information.
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
## 2020-06-16 NOTE — PROGRESS NOTES
1. Have you been to the ER, urgent care clinic since your last visit? Hospitalized since your last visit? No    2. Have you seen or consulted any other health care providers outside of the [address] [place] [address] since your last visit? Include any pap smears or colon screening.  No

## 2020-10-12 ENCOUNTER — VIRTUAL VISIT (OUTPATIENT)
Dept: FAMILY MEDICINE CLINIC | Age: 35
End: 2020-10-12
Payer: COMMERCIAL

## 2020-10-12 DIAGNOSIS — E03.4 HYPOTHYROIDISM DUE TO ACQUIRED ATROPHY OF THYROID: ICD-10-CM

## 2020-10-12 DIAGNOSIS — M54.16 RIGHT LUMBAR RADICULITIS: Primary | ICD-10-CM

## 2020-10-12 DIAGNOSIS — L68.0 HIRSUTISM: ICD-10-CM

## 2020-10-12 DIAGNOSIS — M25.551 RIGHT HIP PAIN: ICD-10-CM

## 2020-10-12 DIAGNOSIS — E66.9 OBESITY (BMI 30.0-34.9): ICD-10-CM

## 2020-10-12 PROCEDURE — 99214 OFFICE O/P EST MOD 30 MIN: CPT | Performed by: INTERNAL MEDICINE

## 2020-10-12 RX ORDER — METHYLPREDNISOLONE 4 MG/1
TABLET ORAL
Qty: 1 DOSE PACK | Refills: 0 | Status: SHIPPED | OUTPATIENT
Start: 2020-10-12 | End: 2021-05-11 | Stop reason: ALTCHOICE

## 2020-10-12 NOTE — PATIENT INSTRUCTIONS

## 2020-10-12 NOTE — PROGRESS NOTES
Chief Complaint   Patient presents with    Hip Pain     Right x2 month; constant     1. Have you been to the ER, urgent care clinic since your last visit? Hospitalized since your last visit? No    2. Have you seen or consulted any other health care providers outside of the [address] [place] [address] since your last visit? Include any pap smears or colon screening.  No     Health Maintenance Due   Topic    PAP AKA CERVICAL CYTOLOGY     Flu Vaccine (1)     '

## 2020-10-12 NOTE — PROGRESS NOTES
Ingris Weinstein is a 36 y.o. female who was seen by synchronous (real-time) audio-video technology on 10/12/2020 for Hip Pain (Right x2 month; constant)        Assessment & Plan:   Diagnoses and all orders for this visit:    1. Right lumbar radiculitis-new presenting problem-if sxs persist, will need to see PT or Ortho/Soprts med depending on Xray results  -     methylPREDNISolone (MEDROL DOSEPACK) 4 mg tablet; As directed in the pack  -     XR SPINE LUMB MIN 4 V; Future    2. Right hip pain-?hip bursitis or from the radicular pain  -     XR HIP RT W OR WO PELV 2-3 VWS; Future    3. Hypothyroidism due to acquired atrophy of thyroid-on Synthroid per Endo    4. Hirsutism-continue with Spironolactone, endo also following this    5. Obesity (BMI 30.0-34. 9)-discussed limiting nicholas to 3730-3868/day and exercising at least 150 min a week; Endo also giving her Phentermine      Keep appt with Endo shortly    Follow-up and Dispositions    · RTC in 2 weeks if symptoms worsen or fail to improve, Xray shortly.   · Follow up in 6 months for ehr other medical issues       712  Subjective:     Health Maintenance Due   Topic Date Due    PAP AKA CERVICAL CYTOLOGY -advised to get this from her Gyn 03/21/2014    Flu Vaccine (1)-advised to get this from her pharmacy 09/01/2020       Seen today for an acute visit for right hip pain  Sxs started a year ago-small spot on the right thigh-numb spot on the side and front; burning and numb; sharp pain from hip to thigh/wakes her up at sleep   Recently daily occurrence since about a few months ago (2 weeks)  Most painful when she lays on the right side  Does not have pain when she lays on her stomach with legs fully extended  Ice or heat does not help  Exercises online for sciatica did not help  OTC meds-tried it Tylenol and Ibuprofen in between-helps with the dull pain but sharp pain and numbness and burning is still there    Appt coming up with Endo  Wt Readings from Last 3 Encounters: 03/24/20 183 lb 3.2 oz (83.1 kg)   02/20/20 183 lb (83 kg)   01/02/20 185 lb (83.9 kg)   current weight-has not weighed recently    Endo note reviewed:  Progress Notes  - documented in this encounter  Judeen Romberg, MD - 03/26/2020 3:30 PM EDT  Formatting of this note might be different from the original.  [facility] [address] of Endocrinology  [address] [place] [address]  Telephone: [phone] Fax: [phone]    CONSULTATION NOTE    REQUESTING PHYSICIAN: Del Wolfe MD    REASON FOR CONSULTATION: Management of Hypothyroidism    Assessment:     Hypothyroidism,  Obesity,  Hirsutism  PCOS ? Josee Pelayo Plan:     Her thyroid test is good and asked to stay on the current dose of Levothyroxine. She was started on Aldactone for Hirsutism. She was explained the risk, benefit and side effects. Asked to contact me if she develops any significant side effects. With regard to pt's weight issue. Pt was advised about diet and exercise to loose weight. Pt has tried comprehensive regime of weight reduction, which includes exercise, dietary and behavior modification. We discussed about weight loss medications. I recommend to try Phentermine which is affordable. Started on 30 mg daily. Pt was explained that Phentermine should only be used as a short-term adjunct in obese patients (defined as those with Body Mass Index (BMI) >30) if they fails above steps and no adequate clinical response. Pt understand this well. Risk, benefit and side effects of Phentermine were discusses. Asked to contact me if she develops any of those. Addiction potentials were discussed. Follow up: 6 months and as needed. Prior to Admission medications    Medication Sig Start Date End Date Taking? Authorizing Provider   phentermine 30 mg capsule Take 30 mg by mouth daily. 3/26/20  Yes Judeen Romberg, MD   spironolactone (ALDACTONE) 25 mg tablet Take 25 mg by mouth daily.  3/10/20  Yes Ashok Aleman MD   levothyroxine (SYNTHROID) 50 mcg tablet Take 1 Tab by mouth Daily (before breakfast). 3/9/20  Yes Marian Swanson MD     Patient Active Problem List    Diagnosis Date Noted    Hirsutism 05/21/2020    Obesity (BMI 30.0-34.9) 05/21/2020    Hypothyroidism due to acquired atrophy of thyroid 07/20/2016    Anxiety 11/13/2013       ROS  Pt denies: Wt loss, Fever/Chills, HA, Visual changes, Fatigue, Chest pain, SOB, KEEN, Abd pain, N/V/D/C, Blood in stool or urine, Edema. Pertinent positive as above in HPI. All others were negative  Objective:   No flowsheet data found. General: alert, cooperative, no distress   Mental  status: normal mood, behavior, speech, dress, motor activity, and thought processes, able to follow commands   HENT: NCAT   Neck: no visualized mass   Resp: no respiratory distress   Neuro: no gross deficits   Skin: no discoloration or lesions of concern on visible areas   Psychiatric: normal affect, consistent with stated mood, no evidence of hallucinations     Additional exam findings: none      We discussed the expected course, resolution and complications of the diagnosis(es) in detail. Medication risks, benefits, costs, interactions, and alternatives were discussed as indicated. I advised her to contact the office if her condition worsens, changes or fails to improve as anticipated. She expressed understanding with the diagnosis(es) and plan. Brett Sarkar, who was evaluated through a patient-initiated, synchronous (real-time) audio-video encounter, and/or her healthcare decision maker, is aware that it is a billable service, with coverage as determined by her insurance carrier. She provided verbal consent to proceed: Yes, and patient identification was verified.  It was conducted pursuant to the emergency declaration under the [facility] [address] waiver authority and the [employer] and [employer] Appropriations Act. A caregiver was present when appropriate. Ability to conduct physical exam was limited. I was at home. The patient was at home.       Conrado Cullen MD

## 2020-10-15 ENCOUNTER — APPOINTMENT (OUTPATIENT)
Dept: FAMILY MEDICINE CLINIC | Age: 35
End: 2020-10-15

## 2021-05-11 ENCOUNTER — VIRTUAL VISIT (OUTPATIENT)
Dept: FAMILY MEDICINE CLINIC | Age: 36
End: 2021-05-11
Payer: COMMERCIAL

## 2021-05-11 DIAGNOSIS — E66.9 OBESITY (BMI 30.0-34.9): ICD-10-CM

## 2021-05-11 DIAGNOSIS — F33.1 MODERATE EPISODE OF RECURRENT MAJOR DEPRESSIVE DISORDER (HCC): Primary | ICD-10-CM

## 2021-05-11 DIAGNOSIS — Z11.59 NEED FOR HEPATITIS C SCREENING TEST: ICD-10-CM

## 2021-05-11 DIAGNOSIS — Z82.49 FAMILY HISTORY OF MI (MYOCARDIAL INFARCTION): ICD-10-CM

## 2021-05-11 DIAGNOSIS — F43.21 GRIEF: ICD-10-CM

## 2021-05-11 DIAGNOSIS — E03.4 HYPOTHYROIDISM DUE TO ACQUIRED ATROPHY OF THYROID: ICD-10-CM

## 2021-05-11 DIAGNOSIS — K64.4 EXTERNAL HEMORRHOIDS: ICD-10-CM

## 2021-05-11 DIAGNOSIS — J01.90 ACUTE NON-RECURRENT SINUSITIS, UNSPECIFIED LOCATION: ICD-10-CM

## 2021-05-11 DIAGNOSIS — L68.0 HIRSUTISM: ICD-10-CM

## 2021-05-11 PROCEDURE — 99214 OFFICE O/P EST MOD 30 MIN: CPT | Performed by: INTERNAL MEDICINE

## 2021-05-11 RX ORDER — AMOXICILLIN AND CLAVULANATE POTASSIUM 875; 125 MG/1; MG/1
1 TABLET, FILM COATED ORAL EVERY 12 HOURS
Qty: 14 TAB | Refills: 0 | Status: SHIPPED | OUTPATIENT
Start: 2021-05-11 | End: 2021-05-18

## 2021-05-11 RX ORDER — SERTRALINE HYDROCHLORIDE 50 MG/1
50 TABLET, FILM COATED ORAL DAILY
Qty: 30 TAB | Refills: 3 | Status: SHIPPED | OUTPATIENT
Start: 2021-05-11 | End: 2022-01-13

## 2021-05-11 NOTE — PATIENT INSTRUCTIONS
Depression Treatment: Care Instructions Your Care Instructions Depression is a condition that affects the way you feel, think, and act. It causes symptoms such as low energy, loss of interest in daily activities, and sadness or grouchiness that goes on for a long time. Depression is very common and affects men and women of all ages. Depression is a medical illness caused by changes in the natural chemicals in your brain. It is not a character flaw, and it does not mean that you are a bad or weak person. It does not mean that you are going crazy. It is important to know that depression can be treated. Medicines, counseling, and self-care can all help. Many people do not get help because they are embarrassed or think that they will get over the depression on their own. But some people do not get better without treatment. Follow-up care is a key part of your treatment and safety. Be sure to make and go to all appointments, and call your doctor if you are having problems. It's also a good idea to know your test results and keep a list of the medicines you take. How can you care for yourself at home? Learn about antidepressant medicines Antidepressant medicines can improve or end the symptoms of depression. You may need to take the medicine for at least 6 months, and often longer. Keep taking your medicine even if you feel better. If you stop taking it too soon, your symptoms may come back or get worse. You may start to feel better within 1 to 3 weeks of taking antidepressant medicine. But it can take as many as 6 to 8 weeks to see more improvement. Talk to your doctor if you have problems with your medicine or if you do not notice any improvement after 3 weeks. Antidepressants can make you feel tired, dizzy, or nervous. Some people have dry mouth, constipation, headaches, sexual problems, an upset stomach, or diarrhea.  Many of these side effects are mild and go away on their own after you take the medicine for a few weeks. Some may last longer. Talk to your doctor if side effects bother you too much. You might be able to try a different medicine. If you are pregnant or breastfeeding, talk to your doctor about what medicines you can take. Learn about counseling In many cases, counseling can work as well as medicines to treat mild to moderate depression. Counseling is done by licensed mental health providers, such as psychologists, social workers, and some types of nurses. It can be done in one-on-one sessions or in a group setting. Many people find group sessions helpful. Cognitive-behavioral therapy is a type of counseling. In this treatment, you learn how to see and change unhelpful thinking styles that may be adding to your depression. Counseling and medicines often work well when used together. When should you call for help? Call 911 anytime you think you may need emergency care. For example, call if: 
  · You feel you cannot stop from hurting yourself or someone else. Call your doctor now or seek immediate medical care if: 
  · You hear voices.  
  · You feel much more depressed. Watch closely for changes in your health, and be sure to contact your doctor if: 
  · You are having problems with your depression medicine.  
  · You are not getting better as expected. Where can you learn more? Go to http://www.gray.com/ Enter Z065 in the search box to learn more about \"Depression Treatment: Care Instructions. \" Current as of: September 23, 2020               Content Version: 12.8 © 3733-0451 [employer]. Care instructions adapted under license by [employer] (which disclaims liability or warranty for this information). If you have questions about a medical condition or this instruction, always ask your healthcare professional. [place] [address] any warranty or liability for your use of this information.

## 2021-05-11 NOTE — PROGRESS NOTES
1. Have you been to the ER, urgent care clinic since your last visit? Hospitalized since your last visit? No    2. Have you seen or consulted any other health care providers outside of the [address] [place] [address] since your last visit? Include any pap smears or colon screening.  No     Health Maintenance Due   Topic Date Due    Hepatitis C Screening  Never done    COVID-19 Vaccine (1) Never done    PAP AKA CERVICAL CYTOLOGY  03/21/2014       Father passed away

## 2021-05-11 NOTE — PROGRESS NOTES
Sean Núñez is a 30 y.o. female who was seen by synchronous (real-time) audio-video technology on 5/11/2021 for Follow Up Chronic Condition        Assessment & Plan:   Diagnoses and all orders for this visit:    1. Moderate episode of recurrent major depressive disorder (HCC)-will start Zoloft; strongly advised to start talk therapy through her EAP or call the mental health line of her insurance; follow up here in 2 months as she will take antibiotics first for the sinusitis before starting the SSRI; I have also told her I do not rx Benzos in situations like this as these can be habit forming and does not help with the chemical imbalance of depression  -     CBC WITH AUTOMATED DIFF; Future  -     sertraline (ZOLOFT) 50 mg tablet; Take 1 Tab by mouth daily. 2. Grief-trigger for recurrence of depression    3. Acute non-recurrent sinusitis, unspecified location-ok to continue with Sudafed for symptomatic relief  -     amoxicillin-clavulanate (AUGMENTIN) 875-125 mg per tablet; Take 1 Tab by mouth every twelve (12) hours for 7 days. 4. Hypothyroidism due to acquired atrophy of thyroid-will check labs and she will follow up with Endo  -     TSH 3RD GENERATION; Future  -     T4, FREE; Future    5. Obesity (BMI 30.0-34. 9)-discussed limiting nicholas to 3714-4627/day and exercising at least 150 min a week; on Phentermine c/o Endo    6. Hirsutism-on Spironolactone c/o Endo as well  -     METABOLIC PANEL, COMPREHENSIVE; Future    7. External hemorrhoids-symptomatic, she will reschedule surgical tx when her schedule permits    8. Family history of MI (myocardial infarction)-advised on importance of keeping risk factors in check-BP, cholesterol, weight  -     LIPID PANEL; Future    9. Need for hepatitis C screening test  -     HEPATITIS C AB; Future        Follow-up and Dispositions    · Return in about 2 months (around 7/11/2021) for follow up, labs shortly.        712  Subjective:     Health Maintenance Due   Topic Date Due    Hepatitis C Screening -ordered with next lab Never done    COVID-19 Vaccine (1)-not sure Never done    PAP AKA CERVICAL CYTOLOGY -she will schedule with Gyn 03/21/2014       Sees Endo for thyroid issues-no follow up in a while, she will schedule  Also on Phentermine and Spironolactone    Hemorrhoid surgery scheduled 7/2020-cancelled it bec of her schedule; still bothering her but still has not scheduled    Current issue: anxiety/depression-cries all the time, hard time sleeping  Positive depression screening  Had hx of depression? Yes, dx at age 16 and was [address] [place] [address], transferred to [facility] for a few months; meds on and off -does not recall meds/julianne effects; last took it 5 yrs ago  Current sxs restarted recently  Dad passed away recently with massive MI at age 61 while at work (HTN)-worried  Xanax for anxiety  Tried Ativan also for anxiety  Tried Wellbutrin  Does not want to be out of it with any med I  plan to give her      BP Readings from Last 3 Encounters:   03/24/20 115/79   02/20/20 126/82   01/02/20 107/56     Lab Results   Component Value Date/Time    Glucose 79 02/20/2020 10:20 AM     Lab Results   Component Value Date/Time    Cholesterol, total 132 02/20/2020 10:20 AM    HDL Cholesterol 40 02/20/2020 10:20 AM    LDL, calculated 79 02/20/2020 10:20 AM    VLDL, calculated 13 02/20/2020 10:20 AM    Triglyceride 66 02/20/2020 10:20 AM     Thinks she has a sinus infection as well-stuffed up to eye sockets, gunky stuff coming out for a few days now  No fever  Frontal headaches  Tried OTC meds-Sudafed sinus, not helping  Has not left her house/no Covid contacts      Prior to Admission medications    Medication Sig Start Date End Date Taking? Authorizing Provider   phentermine 30 mg capsule Take 30 mg by mouth daily. 3/26/20  Yes Kenenth Ferrara MD   spironolactone (ALDACTONE) 25 mg tablet Take 25 mg by mouth daily.  3/10/20  Yes Kenneth Ferrara MD   levothyroxine (SYNTHROID) 50 mcg tablet Take 1 Tab by mouth Daily (before breakfast). 3/9/20  Yes Kelly Matthews MD     10/12/20 5/11/21  Kelly Matthews MD     Patient Active Problem List    Diagnosis Date Noted    External hemorrhoids 05/11/2021    Family history of MI (myocardial infarction) 05/11/2021    Moderate episode of recurrent major depressive disorder ([facility] [address] 05/11/2021    Hirsutism 05/21/2020    Obesity (BMI 30.0-34.9) 05/21/2020    Hypothyroidism due to acquired atrophy of thyroid 07/20/2016    Anxiety 11/13/2013       ROS  Pt denies: Wt loss, Fever/Chills, HA, Visual changes, Fatigue, Chest pain, SOB, KEEN, Abd pain, N/V/D/C, Blood in stool or urine, Edema. Pertinent positive as above in HPI. All others were negative  Objective:   No flowsheet data found. General: alert, cooperative, no distress   Mental  status: normal mood, behavior, speech, dress, motor activity, and thought processes, able to follow commands   HENT: NCAT   Neck: no visualized mass   Resp: no respiratory distress   Neuro: no gross deficits   Skin: no discoloration or lesions of concern on visible areas   Psychiatric: normal affect, consistent with stated mood, no evidence of hallucinations     Additional exam findings: appears sad/depressed; also appeared to have gained weight  Denies any suicidal ideation    We discussed the expected course, resolution and complications of the diagnosis(es) in detail. Medication risks, benefits, costs, interactions, and alternatives were discussed as indicated. I advised her to contact the office if her condition worsens, changes or fails to improve as anticipated. She expressed understanding with the diagnosis(es) and plan. Saleem Hines, was evaluated through a synchronous (real-time) audio-video encounter. The patient (or guardian if applicable) is aware that this is a billable service. Verbal consent to proceed has been obtained within the past 12 months.  The visit was conducted pursuant to the emergency declaration under the [facility], [facility] authority and the [employer] and [employer] General Act. Patient identification was verified, and a caregiver was present when appropriate. The patient was located in a state where the provider was credentialed to provide care. Dre Bains MD    Depression screen positive, PHQ 9 Score: 19, C-SSRS completed, patient instructed to schedule a follow-up visit at this practice and medication was prescribed, drug therapy education given - patient will call for any significant medication side effects or worsening symptoms of depression.

## 2021-08-05 ENCOUNTER — HOSPITAL ENCOUNTER (OUTPATIENT)
Dept: LAB | Age: 36
Discharge: HOME | End: 2021-08-05

## 2021-08-05 LAB — XX-LABCORP SPECIMEN COL,LCBCF: NORMAL

## 2021-08-05 PROCEDURE — 99001 SPECIMEN HANDLING PT-LAB: CPT

## 2021-10-14 ENCOUNTER — OFFICE VISIT (OUTPATIENT)
Dept: FAMILY MEDICINE CLINIC | Age: 36
End: 2021-10-14
Payer: COMMERCIAL

## 2021-10-14 VITALS
SYSTOLIC BLOOD PRESSURE: 118 MMHG | HEART RATE: 80 BPM | HEIGHT: 63 IN | BODY MASS INDEX: 34.2 KG/M2 | DIASTOLIC BLOOD PRESSURE: 81 MMHG | WEIGHT: 193 LBS | RESPIRATION RATE: 16 BRPM | OXYGEN SATURATION: 100 % | TEMPERATURE: 99 F

## 2021-10-14 DIAGNOSIS — Z82.49 FAMILY HISTORY OF PREMATURE CAD: ICD-10-CM

## 2021-10-14 DIAGNOSIS — R07.9 CHEST PAIN, UNSPECIFIED TYPE: ICD-10-CM

## 2021-10-14 DIAGNOSIS — E03.4 HYPOTHYROIDISM DUE TO ACQUIRED ATROPHY OF THYROID: Primary | ICD-10-CM

## 2021-10-14 DIAGNOSIS — R06.09 DYSPNEA ON EXERTION: ICD-10-CM

## 2021-10-14 DIAGNOSIS — Z23 ENCOUNTER FOR IMMUNIZATION: ICD-10-CM

## 2021-10-14 PROCEDURE — 99214 OFFICE O/P EST MOD 30 MIN: CPT | Performed by: INTERNAL MEDICINE

## 2021-10-14 PROCEDURE — 90471 IMMUNIZATION ADMIN: CPT | Performed by: INTERNAL MEDICINE

## 2021-10-14 PROCEDURE — 93000 ELECTROCARDIOGRAM COMPLETE: CPT | Performed by: INTERNAL MEDICINE

## 2021-10-14 PROCEDURE — 90686 IIV4 VACC NO PRSV 0.5 ML IM: CPT | Performed by: INTERNAL MEDICINE

## 2021-10-14 RX ORDER — LEVOTHYROXINE SODIUM 50 UG/1
50 TABLET ORAL
Qty: 90 TABLET | Refills: 0 | Status: SHIPPED | OUTPATIENT
Start: 2021-10-14 | End: 2022-01-13 | Stop reason: SDUPTHER

## 2021-10-14 NOTE — PROGRESS NOTES
Patient seen for routine follow up with c/o fatigue and SOB during exertion. Patient experiences tightening of chest d/t panic attack, and stress. Also stated she has not had any of her medications in months, mourning loss of father in April of this year. 1. Have you been to the ER, urgent care clinic since your last visit? Hospitalized since your last visit? No    2. Have you seen or consulted any other health care providers outside of the [address] [place] [address] since your last visit? Include any pap smears or colon screening. No     Health Maintenance Due   Topic Date Due    COVID-19 Vaccine (1) Never done    Cervical cancer screen  03/21/2018     Patient was given VIS for review,consent was obtained and per orders of Dr. Tess Joyce, injection of Flulaval given by [facility]. Patient observed. No signs nor symptoms of any adverse reactions. Patient tolerated injection well.

## 2021-10-14 NOTE — PATIENT INSTRUCTIONS
Hypothyroidism: Care Instructions  Your Care Instructions     When you have hypothyroidism, your body doesn't make enough thyroid hormone. This hormone helps your body use energy. If your thyroid level is low, you may feel tired, be constipated, have an increase in your blood pressure, or have dry skin or memory problems. You may also get cold easily, even when it is warm. Women with low thyroid levels may have heavy menstrual periods. A blood test to find your thyroid-stimulating hormone (TSH) level is used to check for hypothyroidism. A high TSH level may mean that you have it. The treatment for hypothyroidism is thyroid hormone pills. You should start to feel better in 1 to 2 weeks. Most people need treatment for the rest of their lives. You will need regular visits with your doctor to make sure you are doing well and that you have the right dose of medicine. Follow-up care is a key part of your treatment and safety. Be sure to make and go to all appointments, and call your doctor if you are having problems. It's also a good idea to know your test results and keep a list of the medicines you take. How can you care for yourself at home? · Take your thyroid hormone medicine exactly as prescribed. Call your doctor if you think you are having a problem with your medicine. Most people do not have side effects if they take the right amount of medicine regularly. ? Take the medicine 30 minutes before breakfast, and do not take it with calcium, vitamins, or iron. ? Do not take extra doses of your thyroid medicine. It will not help you get better any faster, and it may cause side effects. ? If you forget to take a dose, do NOT take a double dose of medicine. Take your usual dose the next day. · Tell your doctor about all prescription, herbal, or over-the-counter products you take. · Take care of yourself. Eat a healthy diet, get enough sleep, and get regular exercise. When should you call for help?    Call 911 anytime you think you may need emergency care. For example, call if:    · You passed out (lost consciousness).     · You have severe trouble breathing.     · You have a very slow heartbeat (less than 60 beats a minute).     · You have a low body temperature (95°F or below). Call your doctor now or seek immediate medical care if:    · You feel tired, sluggish, or weak.     · You have trouble remembering things or concentrating.     · You do not begin to feel better 2 weeks after starting your medicine. Watch closely for changes in your health, and be sure to contact your doctor if you have any problems. Where can you learn more? Go to http://www.gray.com/  Enter L014 in the search box to learn more about \"Hypothyroidism: Care Instructions. \"  Current as of: December 2, 2020               Content Version: 13.0  © 9970-3901 [employer]. Care instructions adapted under license by [employer] (which disclaims liability or warranty for this information). If you have questions about a medical condition or this instruction, always ask your healthcare professional. [address] any warranty or liability for your use of this information.

## 2021-10-14 NOTE — PROGRESS NOTES
Assessment/ Plan:   Diagnoses and all orders for this visit:    1. Hypothyroidism due to acquired atrophy of thyroid-will restart Synthroid and check TSH in 4-6 weeks  -     levothyroxine (SYNTHROID) 50 mcg tablet; Take 1 Tablet by mouth Daily (before breakfast). -     TSH 3RD GENERATION; Future    2. Dyspnea on exertion-etio? Could be from the weight gain; will consider PFTs and methacholine challenge test if this persists after hypothyroidism is addressed  Advised to wean herself off of vaping  Hx of smoking  No hx of asthma/wheezing; no particular nocturnal sxs as her KEEN  Can happen at any time  -     XR CHEST PA LAT; Future    3. Chest pain, unspecified type-reassured EKg is normal; will consider stress echo if sxs persist  -     AMB POC EKG ROUTINE W/ 12 LEADS, INTER & REP    4. Family history of premature CAD-as above  -     AMB POC EKG ROUTINE W/ 12 LEADS, INTER & REP    5. Encounter for immunization  -     INFLUENZA VIRUS VAC QUAD,SPLIT,PRESV FREE SYRINGE IM  -     THER/PROPH/DIAG INJECTION, SUBCUT/IM        Follow-up and Dispositions    · Return in about 3 months (around 1/14/2022) for follow up, labs in 4-6 weeks. Chief Complaint   Patient presents with    Follow Up Chronic Condition    Shortness of Breath       Pt is a 30y.o. year old female who presents for follow up of her chronic medical problems    Health Maintenance Due   Topic Date Due    COVID-19 Vaccine (1)-does not want to get it/works from home Never done    Cervical cancer screen -c/o gyn 03/21/2018    Flu shot today      Wt Readings from Last 3 Encounters:   10/14/21 193 lb (87.5 kg)   03/24/20 183 lb 3.2 oz (83.1 kg)   02/20/20 183 lb (83 kg)     Endo appt-not since 2020  Lab Results   Component Value Date/Time    TSH 4.380 08/05/2021 08:52 AM   ran out of Synthroid for a few months now    Did not start antidepressant bec of possible weight gain    Had hemorrhoid surgery? postponed it and will reschedule this    No hx of asthma  Feels short of breath with mild exertion for a while now; like walking at grocery store or when sitting has to take deep breaths  Dad  of MI in April at age 61 so very paranoid; maternal grandmother had first MI at age 44, maternal uncle with MI in early 52's  Chest gets very tight at times/?anxious/stressed dealing with dad's estate since she is the only child    Vapes, quit cig 3 yrs ago  Trying to cut back on vape    No wheezing    Lab Results   Component Value Date/Time    Cholesterol, total 151 2021 08:52 AM    HDL Cholesterol 41 2021 08:52 AM    LDL, calculated 94 2021 08:52 AM    LDL, calculated 79 2020 10:20 AM    VLDL, calculated 16 2021 08:52 AM    VLDL, calculated 13 2020 10:20 AM    Triglyceride 82 2021 08:52 AM       Lab Results   Component Value Date/Time    Glucose 79 2021 08:52 AM       ROS:    Pt denies: Wt loss, Fever/Chills, HA, Visual changes, Fatigue, Chest pain, SOB, KEEN, Abd pain, N/V/D/C, Blood in stool or urine, Edema. Pertinent positive as above in HPI. All others were negative    Patient Active Problem List   Diagnosis Code    Hypothyroidism due to acquired atrophy of thyroid E03.4    Anxiety F41.9    Hirsutism L68.0    Obesity (BMI 30.0-34. 9) E66.9    External hemorrhoids K64.4    Family history of MI (myocardial infarction) Z82.49    Moderate episode of recurrent major depressive disorder (HCC) F33.1       Past Medical History:   Diagnosis Date    Anxiety     Depression 1 year    anxiety     Thyroid disease        Current Outpatient Medications   Medication Sig Dispense Refill    levothyroxine (SYNTHROID) 50 mcg tablet Take 1 Tablet by mouth Daily (before breakfast). 90 Tablet 0    sertraline (ZOLOFT) 50 mg tablet Take 1 Tab by mouth daily. (Patient not taking: Reported on 10/14/2021) 30 Tab 3    phentermine 30 mg capsule Take 30 mg by mouth daily.  (Patient not taking: Reported on 10/14/2021)      spironolactone (ALDACTONE) 25 mg tablet Take 25 mg by mouth daily. (Patient not taking: Reported on 10/14/2021)         Social History     Tobacco Use   Smoking Status Never Smoker   Smokeless Tobacco Never Used       No Known Allergies    Patient Labs were reviewed: yes    Patient Past Records were reviewed: yes      Objective:     Vitals:    10/14/21 0910   BP: 118/81   Pulse: 80   Resp: 16   Temp: 99 °F (37.2 °C)   TempSrc: Temporal   SpO2: 100%   Weight: 193 lb (87.5 kg)   Height: 5' 3\" (1.6 m)     Body mass index is 34.19 kg/m². Exam:   Appearance: alert, well appearing,  oriented to person, place, and time, acyanotic, in no respiratory distress and well hydrated. HEENT:  NC/AT, pink conj, anicteric sclerae  Neck:  No cervical lymphadenopathy, no JVD, no thyromegaly, no carotid bruit  Heart:  RRR without M/R/G  Lungs:  CTAB, no rhonchi, rales, or wheezes with good air exchange   Abdomen:  Non-tender, pos bowel sounds, no hepatosplenomegaly  Ext:  No C/C/E    Skin: no rash  Neuro: no lateralizing signs, CNs II-XII intact    EKG today showed NSR, no LVH, no ischemic changes seen        I have discussed the diagnosis with the patient and the intended plan as seen in the above orders. The patient has received an After-Visit Summary and questions were answered concerning future plans. Medication Side Effects and Warnings were discussed with patient: yes    Patient verbalized understanding of above instructions.     Harper Leslie MD  Internal Medicine  [facility]

## 2022-01-12 LAB — SARS-COV-2, NAA: NOT DETECTED

## 2022-01-13 ENCOUNTER — VIRTUAL VISIT (OUTPATIENT)
Dept: FAMILY MEDICINE CLINIC | Age: 37
End: 2022-01-13
Payer: COMMERCIAL

## 2022-01-13 DIAGNOSIS — F33.1 MODERATE EPISODE OF RECURRENT MAJOR DEPRESSIVE DISORDER (HCC): ICD-10-CM

## 2022-01-13 DIAGNOSIS — J20.9 ACUTE BRONCHITIS, UNSPECIFIED ORGANISM: Primary | ICD-10-CM

## 2022-01-13 DIAGNOSIS — E03.4 HYPOTHYROIDISM DUE TO ACQUIRED ATROPHY OF THYROID: ICD-10-CM

## 2022-01-13 PROCEDURE — 99214 OFFICE O/P EST MOD 30 MIN: CPT | Performed by: INTERNAL MEDICINE

## 2022-01-13 RX ORDER — DOXYCYCLINE 100 MG/1
100 TABLET ORAL 2 TIMES DAILY
Qty: 14 TABLET | Refills: 0 | Status: SHIPPED | OUTPATIENT
Start: 2022-01-13 | End: 2022-01-20

## 2022-01-13 RX ORDER — ALBUTEROL SULFATE 90 UG/1
1 AEROSOL, METERED RESPIRATORY (INHALATION)
Qty: 1 EACH | Refills: 0 | Status: SHIPPED | OUTPATIENT
Start: 2022-01-13 | End: 2022-02-04 | Stop reason: SDUPTHER

## 2022-01-13 RX ORDER — LEVOTHYROXINE SODIUM 50 UG/1
50 TABLET ORAL
Qty: 90 TABLET | Refills: 0 | Status: SHIPPED | OUTPATIENT
Start: 2022-01-13 | End: 2022-04-15

## 2022-01-13 RX ORDER — HYDROCODONE POLISTIREX AND CHLORPHENIRAMINE POLISTIREX 10; 8 MG/5ML; MG/5ML
1 SUSPENSION, EXTENDED RELEASE ORAL
Qty: 100 ML | Refills: 0 | Status: SHIPPED | OUTPATIENT
Start: 2022-01-13 | End: 2022-01-23

## 2022-01-13 NOTE — PROGRESS NOTES
Patient seen virtually for productive cough x3wks, no fever. Covid results negative, patient is not vaccinated    1. Have you been to the ER, urgent care clinic since your last visit? Hospitalized since your last visit? Yes [facility] for cough prescribed Amoxicillan x 10days  On fourth day of antibiotic . Covid test and chest xray taken with negative findings    2. Have you seen or consulted any other health care providers outside of the [address] [place] [address] since your last visit? Include any pap smears or colon screening.  No    Health Maintenance Due   Topic Date Due    COVID-19 Vaccine (1) Never done    Cervical cancer screen  03/21/2018

## 2022-01-13 NOTE — PATIENT INSTRUCTIONS
Bronchitis: Care Instructions  Your Care Instructions     Bronchitis is inflammation of the bronchial tubes, which carry air to the lungs. The tubes swell and produce mucus, or phlegm. The mucus and inflamed bronchial tubes make you cough. You may have trouble breathing. Most cases of bronchitis are caused by viruses like those that cause colds. Antibiotics usually do not help and they may be harmful. Bronchitis usually develops rapidly and lasts about 2 to 3 weeks in otherwise healthy people. Follow-up care is a key part of your treatment and safety. Be sure to make and go to all appointments, and call your doctor if you are having problems. It's also a good idea to know your test results and keep a list of the medicines you take. How can you care for yourself at home? · Take all medicines exactly as prescribed. Call your doctor if you think you are having a problem with your medicine. · Get some extra rest.  · Take an over-the-counter pain medicine, such as acetaminophen (Tylenol), ibuprofen (Advil, Motrin), or naproxen (Aleve) to reduce fever and relieve body aches. Read and follow all instructions on the label. · Do not take two or more pain medicines at the same time unless the doctor told you to. Many pain medicines have acetaminophen, which is Tylenol. Too much acetaminophen (Tylenol) can be harmful. · Take an over-the-counter cough medicine to help quiet a dry, hacking cough so that you can sleep. Avoid cough medicines that have more than one active ingredient. Read and follow all instructions on the label. · Do not smoke. Smoking can make bronchitis worse. If you need help quitting, talk to your doctor about stop-smoking programs and medicines. These can increase your chances of quitting for good. When should you call for help? Call 911 anytime you think you may need emergency care. For example, call if:    · You have severe trouble breathing.    Call your doctor now or seek immediate medical care if:    · You have new or worse trouble breathing.     · You cough up dark brown or bloody mucus (sputum).     · You have a new or higher fever.     · You have a new rash. Watch closely for changes in your health, and be sure to contact your doctor if:    · You cough more deeply or more often, especially if you notice more mucus or a change in the color of your mucus.     · You are not getting better as expected. Where can you learn more? Go to http://www.gray.com/  Enter H333 in the search box to learn more about \"Bronchitis: Care Instructions. \"  Current as of: July 6, 2021               Content Version: 13.0  © 2006-2021 [employer]. Care instructions adapted under license by [employer] (which disclaims liability or warranty for this information). If you have questions about a medical condition or this instruction, always ask your healthcare professional. [address] any warranty or liability for your use of this information.

## 2022-01-19 ENCOUNTER — TELEPHONE (OUTPATIENT)
Dept: FAMILY MEDICINE CLINIC | Age: 37
End: 2022-01-19

## 2022-01-19 NOTE — TELEPHONE ENCOUNTER
----- Message from Carlos Rojelioo sent at 1/19/2022  9:13 AM EST -----  Subject: Message to Provider    QUESTIONS  Information for Provider? Pt is calling she had a VV on 1/13 and was told   to call back if her symptoms had not improved. She is calling requesting a   steroid to be called in for her cough congestion symptoms. Dell Sumner, [address] [facility] please contact pt asap with any questions.   ---------------------------------------------------------------------------  --------------  [address]  What is the best way for the office to contact you? OK to leave message on   voicemail  Preferred Call Back Phone Number? [phone]  ---------------------------------------------------------------------------  --------------  SCRIPT ANSWERS  Relationship to Patient?  Self

## 2022-01-21 DIAGNOSIS — J20.9 ACUTE BRONCHITIS, UNSPECIFIED ORGANISM: Primary | ICD-10-CM

## 2022-01-21 RX ORDER — METHYLPREDNISOLONE 4 MG/1
TABLET ORAL
Qty: 1 DOSE PACK | Refills: 0 | Status: SHIPPED | OUTPATIENT
Start: 2022-01-21 | End: 2022-04-04 | Stop reason: ALTCHOICE

## 2022-02-04 ENCOUNTER — VIRTUAL VISIT (OUTPATIENT)
Dept: FAMILY MEDICINE CLINIC | Age: 37
End: 2022-02-04
Payer: COMMERCIAL

## 2022-02-04 DIAGNOSIS — R05.9 COUGH: Primary | ICD-10-CM

## 2022-02-04 DIAGNOSIS — J20.9 ACUTE BRONCHITIS, UNSPECIFIED ORGANISM: ICD-10-CM

## 2022-02-04 PROBLEM — E03.9 HYPOTHYROIDISM: Status: ACTIVE | Noted: 2020-12-29

## 2022-02-04 PROCEDURE — 99213 OFFICE O/P EST LOW 20 MIN: CPT | Performed by: NURSE PRACTITIONER

## 2022-02-04 RX ORDER — BENZONATATE 200 MG/1
200 CAPSULE ORAL
Qty: 21 CAPSULE | Refills: 0 | Status: SHIPPED | OUTPATIENT
Start: 2022-02-04 | End: 2022-02-11

## 2022-02-04 RX ORDER — ALBUTEROL SULFATE 90 UG/1
1 AEROSOL, METERED RESPIRATORY (INHALATION)
Qty: 1 EACH | Refills: 0 | Status: SHIPPED | OUTPATIENT
Start: 2022-02-04 | End: 2022-06-08 | Stop reason: SDUPTHER

## 2022-02-04 RX ORDER — LEVOFLOXACIN 500 MG/1
500 TABLET, FILM COATED ORAL DAILY
Qty: 10 TABLET | Refills: 0 | Status: SHIPPED | OUTPATIENT
Start: 2022-02-04 | End: 2022-02-14

## 2022-02-04 RX ORDER — HYDROCODONE POLISTIREX AND CHLORPHENIRAMINE POLISTIREX 10; 8 MG/5ML; MG/5ML
5 SUSPENSION, EXTENDED RELEASE ORAL
Qty: 70 ML | Refills: 0 | Status: SHIPPED | OUTPATIENT
Start: 2022-02-04 | End: 2022-02-11

## 2022-02-04 NOTE — PROGRESS NOTES
Jennifer Gallego (: 1985) is a 29 y.o. female, established patient, here for evaluation of the following chief complaint(s):   Cough (2 month ) and Cold Symptoms     covid test 3 weeks ago and was negative. Pt has had cough for 2 months   Patient seen for virtual visit on  by PCP and prescribed Doxycycline, tussionex, and albuterol. Pt then called up a week later stating feeling no better. Medrol dose pack sent in at that time. Patient states cough is back and is productive. Clear/white mucous with green tinge. Denies fever or chills. Denies sinus pressure or tenderness. Pt complains of fatigue and headache from continuous coughing. ASSESSMENT/PLAN:  Below is the assessment and plan developed based on review of pertinent history, labs, studies, and medications. 1. Cough  -     HYDROcodone-chlorpheniramine (TUSSIONEX) 10-8 mg/5 mL suspension; Take 5 mL by mouth every twelve (12) hours as needed for Cough for up to 7 days. Max Daily Amount: 10 mL., Normal, Disp-70 mL, R-0  -     benzonatate (TESSALON) 200 mg capsule; Take 1 Capsule by mouth three (3) times daily as needed for Cough for up to 7 days. , Normal, Disp-21 Capsule, R-0  2. Acute bronchitis, unspecified organism  -     albuterol (PROVENTIL HFA, VENTOLIN HFA, PROAIR HFA) 90 mcg/actuation inhaler; Take 1 Puff by inhalation every six (6) hours as needed for Cough., Normal, Disp-1 Each, R-0  -     levoFLOXacin (LEVAQUIN) 500 mg tablet; Take 1 Tablet by mouth daily for 10 days. Indications: pneumonia caused by bacteria, Normal, Disp-10 Tablet, R-0      No follow-ups on file.     SUBJECTIVE/OBJECTIVE:  HPI    Review of Systems     No data recorded     Physical Exam    [INSTRUCTIONS:  \"[x]\" Indicates a positive item  \"[]\" Indicates a negative item  -- DELETE ALL ITEMS NOT EXAMINED]    Constitutional: [x] Appears well-developed and well-nourished [x] No apparent distress      [] Abnormal -     Mental status: [x] Alert and awake  [x] Oriented to person/place/time [x] Able to follow commands    [] Abnormal -     Eyes:   EOM    [x]  Normal    [] Abnormal -   Sclera  [x]  Normal    [] Abnormal -          Discharge [x]  None visible   [] Abnormal -     HENT: [x] Normocephalic, atraumatic  [] Abnormal -   [x] Mouth/Throat: Mucous membranes are moist    External Ears [x] Normal  [] Abnormal -    Neck: [x] No visualized mass [] Abnormal -     Pulmonary/Chest: [x] Respiratory effort normal   [x] No visualized signs of difficulty breathing or respiratory distress        [x] Abnormal - frequent cough during video call     Musculoskeletal:   [x] Normal gait with no signs of ataxia         [x] Normal range of motion of neck        [] Abnormal -     Neurological:        [x] No Facial Asymmetry (Cranial nerve 7 motor function) (limited exam due to video visit)          [x] No gaze palsy        [] Abnormal -          Skin:        [x] No significant exanthematous lesions or discoloration noted on facial skin         [] Abnormal -            Psychiatric:       [x] Normal Affect [] Abnormal -        [x] No Hallucinations    Other pertinent observable physical exam findings:-        On this date 02/04/2022 I have spent 5 minutes reviewing previous notes, test results and face to face (virtual) with the patient discussing the diagnosis and importance of compliance with the treatment plan as well as documenting on the day of the visitLeslee Vinita Dawna was evaluated through a synchronous (real-time) audio-video encounter. The patient (or guardian if applicable) is aware that this is a billable service, which includes applicable co-pays. Verbal consent to proceed has been obtained. The visit was conducted pursuant to the emergency declaration under the [facility], [address] [place] [address] authority and the [employer] and [employer] General Act.   Patient identification was verified, and a caregiver was present when appropriate. The patient was located at home in a state where the provider was licensed to provide care. An electronic signature was used to authenticate this note.   -- Tahira Payne NP

## 2022-02-16 ENCOUNTER — TELEPHONE (OUTPATIENT)
Dept: FAMILY MEDICINE CLINIC | Age: 37
End: 2022-02-16

## 2022-02-16 NOTE — TELEPHONE ENCOUNTER
----- Message from Fadi Pedroza sent at 2/16/2022 10:52 AM EST -----  Subject: Referral Request    QUESTIONS   Reason for referral request? Patient is requesting a referral to ENT. Has the physician seen you for this condition before? Yes  Select a date? 2022-01-13  Select the Provider the patient wants to be referred to, if known (PCP or   Specialist)? Outside Physician - Unknown   Preferred Specialist (if applicable)? Do you already have an appointment scheduled? No  Additional Information for Provider? Patent has been sick with ear   infection for several months. Please send referral to any ENT  ---------------------------------------------------------------------------  --------------  CALL BACK INFO  What is the best way for the office to contact you? OK to leave message on   voicemail  Preferred Call Back Phone Number?  [phone]

## 2022-02-25 NOTE — PROGRESS NOTES
Margie Mcqueen is a 29 y.o. female who was seen by synchronous (real-time) audio-video technology on 1/13/2022 for Follow Up Chronic Condition and Cough (x3wks)        Assessment & Plan:   Diagnoses and all orders for this visit:    1. Acute bronchitis, unspecified organism  -     HYDROcodone-chlorpheniramine (TUSSIONEX) 10-8 mg/5 mL suspension; Take 5 mL by mouth every twelve (12) hours as needed for Cough for up to 10 days. Max Daily Amount: 10 mL. -     doxycycline (ADOXA) 100 mg tablet; Take 1 Tablet by mouth two (2) times a day for 7 days. -     albuterol (PROVENTIL HFA, VENTOLIN HFA, PROAIR HFA) 90 mcg/actuation inhaler; Take 1 Puff by inhalation every six (6) hours as needed for Cough. 2. Hypothyroidism due to acquired atrophy of thyroid  -     levothyroxine (SYNTHROID) 50 mcg tablet; Take 1 Tablet by mouth Daily (before breakfast). 3. Moderate episode of recurrent major depressive disorder ([facility]        Follow-up and Dispositions    · Return in about 3 months (around 4/13/2022) for follow up.        712  Subjective:     Health Maintenance Due   Topic Date Due    COVID-19 Vaccine (1)-unvaccinated Never done    Cervical cancer screen  03/21/2018     Current sxs started on 3 weeks ago  [facility] neg, swab for covid neg  Sinus infection she was told-sent home on  Amox 875 BID for 10 days  No hx of asthma; non smoker  Phlegm at times, dry at times  No one else sick  No fever, nose is runny; denies shortness of breath/deep breath would give her a cough;   Depression-no longer on meds    Lab Results   Component Value Date/Time    TSH 4.380 08/05/2021 08:52 AM   needs refill  Has not been to Endo in a while    Wt Readings from Last 3 Encounters:   10/14/21 193 lb (87.5 kg)   03/24/20 183 lb 3.2 oz (83.1 kg)   02/20/20 183 lb (83 kg)   off Phenrtermine  Current weight    Gained weight with zoloft -stopped this  Prior to Admission medications    Medication Sig Start Date End Date Physical Therapy  Visit Type: initial evaluation  Precautions:  Medical precautions:  fall risk; standard precautions.    02/20: Pt admitted with SOB caused by acute on chronic respiratory failure d/t COPD/emphysema. Pt also with thrush.    PMH includes severe COPD in 3 L home O2 with secondary PHTN, and HTN, lung nodule, L retinal detachment.   Lines:       Lines in chart and on patient reviewed, precautions maintained throughout session.  Vision:     Visual History: L retinal detachment.  Safety Measures: bed alarm, chair alarm and bed rails    SUBJECTIVE  Patient agreed to participate in therapy this date.  \"I think I might be moving to assisted living soon.\"  Patient / Family Goal: return to previous functional status, maximize function and return home    Pain     At onset of session (out of 10): 0  RN informed on pain level     OBJECTIVE     At rest in sitting: /71mmHg, HR 92bpm, SpO2 99%, on 3L O2/min via NC  In sitting after gait: /71mmHg, HR 86bpm, SpO2 99%, on 3L O2/min via NC  At rest in sitting at end of session: /64mmHg, HR 85bpm, SpO2 99%, on 3L O2/min via NC    Pt rated her SOB 8/10 during/after the walk.   Oriented to person, place, time and situation     Arousal alertness: appropriate responses to stimuli    Affect/Behavior: alert, appropriate, calm, pleasant and cooperative  Functional Communication/Cognition    Overall status:  Within functional limits    Form of communication:  Verbal    Commands: follows all commands and directions consistently.  Range of Motion (measured in degrees unless otherwise noted, active unless indicated)  WNL: LLE, RLE  Strength (out of 5 unless otherwise indicated)   Knee:   - Extension:      • Left: 4+      • Right: 5  Ankle:    - Dorsiflexion:      • Left: 5      • Right: 5  Balance (trials measured in sec unless otherwise indicted)    Standing - Firm Surface - Eyes Open: Static: modified independent double upper extremity support (with  RW)  Neurological Comments / Details: Light touch grossly intact for B LE where tested. Pt reported that her legs are tingling at rest also when not touched.    Transfers:    Assistive devices: 2-wheeled walker and gait belt    Sit to stand: modified independent    Stand to sit: modified independent  Training completed:    Tasks: sit to stand and stand to sit    Education details: body mechanics and patient safety  Gait/Ambulation:     Assistance: modified independent   Assistive device: gait belt and 2-wheeled walker    Distance (ft): 80    Pattern: within functional limits    Type: decreased jennifer  Training Completed:    Tasks: gait training on level surfaces    Education details: body mechanics and patient safety    Pt needed to take 3 short standing breaks d/t SOB which she rated as 8/10.       Interventions     Training provided: activity tolerance, functional ambulation and energy conservation    Skilled input: Verbal instruction/cues and tactile instruction/cues  Verbal Consent: Writer verbally educated and received verbal consent for hand placement, positioning of patient, and techniques to be performed today from patient for hand placement and palpation for techniques, clothing adjustments for techniques and therapist position for techniques as described above and how they are pertinent to the patient's plan of care.       ASSESSMENT    Impairments: endurance, activity tolerance and shortness of breath  Functional Limitations: all functional mobility       Discharge Recommendations   Recommendation for Discharge: PT IL: Patient requires  intermittent assistance to perform mobility and/or ADLs safely        PT/OT Mobility Equipment for Discharge: Pt owns a rollator.      PT Identified Barriers to Discharge: SOB with mobility     Skilled therapy is not required due to Pt will be discharged from hospital PT as she is at mod indep with RW for mobility which is her baseline. Pt to keep ambulating in the  Taking? Authorizing Provider   levothyroxine (SYNTHROID) 50 mcg tablet Take 1 Tablet by mouth Daily (before breakfast). 10/14/21  Yes Lori Hicks MD     5/11/21 1/13/22  Lori Hicks MD     3/26/20 1/13/22  Orlin Atkinson MD     3/10/20 1/13/22  Orlin Atkinson MD     Patient Active Problem List    Diagnosis Date Noted    External hemorrhoids 05/11/2021    Family history of MI (myocardial infarction) 05/11/2021    Moderate episode of recurrent major depressive disorder ([facility] 05/11/2021    Hirsutism 05/21/2020    Obesity (BMI 30.0-34.9) 05/21/2020    Hypothyroidism due to acquired atrophy of thyroid 07/20/2016    Anxiety 11/13/2013       ROS    Objective:   No flowsheet data found. General: alert, cooperative, no distress   Mental  status: normal mood, behavior, speech, dress, motor activity, and thought processes, able to follow commands   HENT: NCAT   Neck: no visualized mass   Resp: no respiratory distress   Neuro: no gross deficits   Skin: no discoloration or lesions of concern on visible areas   Psychiatric: normal affect, consistent with stated mood, no evidence of hallucinations     Additional exam findings: We discussed the expected course, resolution and complications of the diagnosis(es) in detail. Medication risks, benefits, costs, interactions, and alternatives were discussed as indicated. I advised her to contact the office if her condition worsens, changes or fails to improve as anticipated. She expressed understanding with the diagnosis(es) and plan. Arslan Jadiel, was evaluated through a synchronous (real-time) audio-video encounter. The patient (or guardian if applicable) is aware that this is a billable service. Verbal consent to proceed has been obtained within the past 12 months.  The visit was conducted pursuant to the emergency declaration under the [facility] waiver authority and the Coronavirus Preparedness and Response Supplemental Appropriations Act. Patient identification was verified, and a caregiver was present when appropriate. The patient was located in a state where the provider was credentialed to provide care.     Sheri Mittal MD hallway daily with RN staff for the rest of her hospital stay. PT to sign off.    Pain at end of session: RN informed on pain level 0/10  Predicted patient presentation: Low (stable) - Patient comorbidities and complexities, as defined above, will have little effect on progress for prescribed plan of care.    End of Session:   Location: in chair  Safety measures: alarm system in place/re-engaged, call light within reach, equipment intact and lines intact  Handoff to: nurse  Education Provided:   Learning assessment:  - Primary learner: patient  - Are they ready to learn: yes  - Preferred learning style: verbal  - Barriers to learning: no barriers apparent at this time  Education provided during session:  - Receiving education: patient  - Results of above outlined education: Verbalizes understanding    PLAN    Suggestions for next session as indicated:       Agreement to plan and goals: patient agrees with goals and treatment plan      Documented in the chart in the following areas: Prior Level of Function. Assessment.      Therapy procedure time and total treatment time can be found documented on the Time Entry flowsheet

## 2022-03-04 ENCOUNTER — TELEPHONE (OUTPATIENT)
Dept: FAMILY MEDICINE CLINIC | Age: 37
End: 2022-03-04

## 2022-03-09 DIAGNOSIS — J20.9 ACUTE BRONCHITIS, UNSPECIFIED ORGANISM: ICD-10-CM

## 2022-03-09 RX ORDER — ALBUTEROL SULFATE 90 UG/1
1 AEROSOL, METERED RESPIRATORY (INHALATION)
Qty: 1 EACH | Refills: 0 | OUTPATIENT
Start: 2022-03-09

## 2022-03-18 PROBLEM — F33.1 MODERATE EPISODE OF RECURRENT MAJOR DEPRESSIVE DISORDER (HCC): Status: ACTIVE | Noted: 2021-05-11

## 2022-03-19 PROBLEM — E66.811 OBESITY (BMI 30.0-34.9): Status: ACTIVE | Noted: 2020-05-21

## 2022-03-19 PROBLEM — L68.0 HIRSUTISM: Status: ACTIVE | Noted: 2020-05-21

## 2022-03-19 PROBLEM — E03.9 HYPOTHYROIDISM: Status: ACTIVE | Noted: 2020-12-29

## 2022-03-19 PROBLEM — E66.9 OBESITY (BMI 30.0-34.9): Status: ACTIVE | Noted: 2020-05-21

## 2022-03-19 PROBLEM — K64.4 EXTERNAL HEMORRHOIDS: Status: ACTIVE | Noted: 2021-05-11

## 2022-03-20 PROBLEM — Z82.49 FAMILY HISTORY OF MI (MYOCARDIAL INFARCTION): Status: ACTIVE | Noted: 2021-05-11

## 2022-03-29 ENCOUNTER — NURSE TRIAGE (OUTPATIENT)
Dept: OTHER | Facility: CLINIC | Age: 37
End: 2022-03-29

## 2022-03-29 NOTE — TELEPHONE ENCOUNTER
Received call from Lucero Garcia [phone] at [facility] with Red Flag Complaint. Subjective: Caller states \"I tested negative for Covid. They thought it was a sinus infection and it didn't go away. Then they said I had pneumonia. \"     Current Symptoms: Productive cough    Onset: Alexey    Associated Symptoms: SOB- can happen with or without movement- coughing up whitish clear- yellow/brown phlegm    Pain Severity: 0/10    Temperature: Denies    What has been tried: Two round of antibiotics- finished last month    Pregnant: No    Recommended disposition: See PCP within 3 Days    Care advice provided, patient verbalizes understanding; denies any other questions or concerns; instructed to call back for any new or worsening symptoms. Patient/Caller agrees with recommended disposition; writer provided warm transfer to Malu Edward at [facility] for appointment scheduling    Attention Provider: Thank you for allowing me to participate in the care of your patient. The patient was connected to triage in response to information provided to the [facility]. Please do not respond through this encounter as the response is not directed to a shared pool.     Reason for Disposition   [1] Completed antibiotic treatment AND [2] cough not improved    Protocols used: PNEUMONIA ON ANTIBIOTIC FOLLOW-UP CALL-ADULT-

## 2022-03-31 ENCOUNTER — VIRTUAL VISIT (OUTPATIENT)
Dept: FAMILY MEDICINE CLINIC | Age: 37
End: 2022-03-31

## 2022-04-04 ENCOUNTER — OFFICE VISIT (OUTPATIENT)
Dept: FAMILY MEDICINE CLINIC | Age: 37
End: 2022-04-04
Payer: COMMERCIAL

## 2022-04-04 VITALS
TEMPERATURE: 98.7 F | OXYGEN SATURATION: 98 % | SYSTOLIC BLOOD PRESSURE: 120 MMHG | RESPIRATION RATE: 16 BRPM | DIASTOLIC BLOOD PRESSURE: 79 MMHG | HEART RATE: 90 BPM | WEIGHT: 196.6 LBS | HEIGHT: 63 IN | BODY MASS INDEX: 34.84 KG/M2

## 2022-04-04 DIAGNOSIS — Z72.89 CURRENT EVERY DAY VAPING: ICD-10-CM

## 2022-04-04 DIAGNOSIS — R05.3 CHRONIC COUGH: Primary | ICD-10-CM

## 2022-04-04 DIAGNOSIS — J30.9 ALLERGIC RHINITIS, UNSPECIFIED SEASONALITY, UNSPECIFIED TRIGGER: ICD-10-CM

## 2022-04-04 PROCEDURE — 99214 OFFICE O/P EST MOD 30 MIN: CPT | Performed by: INTERNAL MEDICINE

## 2022-04-04 NOTE — PROGRESS NOTES
Patient seen for follow up on antibiotics for cough and congestion Sxs of chest congestion and cough since December, currently taking OTC Claritin and Flonase  Patient is not currently vaccinated from [facility]. Health Maintenance Due   Topic Date Due    COVID-19 Vaccine (1) Never done    Cervical cancer screen  03/21/2018     1. \"Have you been to the ER, urgent care clinic since your last visit? Hospitalized since your last visit? \" No    2. \"Have you seen or consulted any other health care providers outside of the [address] [place] [address] since your last visit? \" No     3. For patients aged 39-70: Has the patient had a colonoscopy / FIT/ Cologuard? NA - based on age      If the patient is female:    4. For patients aged 41-77: Has the patient had a mammogram within the past 2 years? NA - based on age or sex      11. For patients aged 21-65: Has the patient had a pap smear?  Yes - no Care Gap present

## 2022-04-04 NOTE — PROGRESS NOTES
Assessment/ Plan:   Diagnoses and all orders for this visit:    1. Chronic cough  -     XR CHEST PA LAT; Future  -     REFERRAL TO PULMONARY DISEASE  Discussed DDx with patient: allergic rhinitis ruled out as patient has been using her flonase daily as well as a daily antihistamine  ? GERD-possible bec of recent weight gain  Chronic bronchitis/bronchiectasis bec of greenish phlegm-?related to vaping--referred to pulmo for PFTs, possible CT chest  Asthma-possible bec cough is worse at night and has wheezing early AM; declined to do therapeutic trial of asthma inhaler  Sinusitis-unlikely as this would have already responded to the antibiotics given; I did not think she needs to see ENT at this time    2. Current every day vaping-advised to try to stop this  -     REFERRAL TO PULMONARY DISEASE    3. Allergic rhinitis, unspecified seasonality, unspecified trigger-continue with daily Claritin and Flonase        Follow-up and Dispositions    · Return in about 3 months (around 7/4/2022) for follow up. Chief Complaint   Patient presents with    Cough    Nasal Congestion       Pt is a 29y.o. year old female who presents for follow up of her chronic medical problems    Health Maintenance Due   Topic Date Due    COVID-19 Vaccine (1) Never done    Cervical cancer screen  03/21/2018      Cough since January; tested neg for COVID-seen at urgent care  Has had 2 courses of antibiotics-Doxy, Levaquin for 10 days from NP in Feb  Had medrol as well    Hx of asthma?  No    Feels better but still coughing something greenish  ?sinus issues-wants to see ENT; smell and taste is on and off-normal for her at this time of the year    Smoking?does do vaping    Since she has been sick, has to use the rescue inhaler 3x a day at least; hears some wheezing    Takes Claritin and Flonase for allergies daily-helps    Used to smoke prior to vaping for the last 4 years  [place]ives [place] Readings from Last 3 Encounters: 04/04/22 196 lb 9.6 oz (89.2 kg)   10/14/21 193 lb (87.5 kg)   03/24/20 183 lb 3.2 oz (83.1 kg)     ROS:    Pt denies: Wt loss, Fever/Chills, HA, Visual changes, Fatigue, Chest pain, SOB, KEEN, Abd pain, N/V/D/C, Blood in stool or urine, Edema. Pertinent positive as above in HPI. All others were negative    Patient Active Problem List   Diagnosis Code    Hypothyroidism due to acquired atrophy of thyroid E03.4    Anxiety F41.9    Hirsutism L68.0    Obesity (BMI 30.0-34. 9) E66.9    External hemorrhoids K64.4    Family history of MI (myocardial infarction) Z82.49    Moderate episode of recurrent major depressive disorder ([facility] F33.1    Hypothyroidism E03.9       Past Medical History:   Diagnosis Date    Anxiety     Depression 1 year    anxiety     Thyroid disease        Current Outpatient Medications   Medication Sig Dispense Refill    albuterol (PROVENTIL HFA, VENTOLIN HFA, PROAIR HFA) 90 mcg/actuation inhaler Take 1 Puff by inhalation every six (6) hours as needed for Cough. 1 Each 0    levothyroxine (SYNTHROID) 50 mcg tablet Take 1 Tablet by mouth Daily (before breakfast). 90 Tablet 0       Social History     Tobacco Use   Smoking Status Never Smoker   Smokeless Tobacco Never Used       No Known Allergies    Patient Labs were reviewed: yes    Patient Past Records were reviewed: yes      Objective:     Vitals:    04/04/22 1300   BP: 120/79   Pulse: 90   Resp: 16   Temp: 98.7 °F (37.1 °C)   TempSrc: Temporal   SpO2: 98%   Weight: 196 lb 9.6 oz (89.2 kg)   Height: 5' 3\" (1.6 m)     Body mass index is 34.83 kg/m². Exam:   Appearance: alert, well appearing,  oriented to person, place, and time, acyanotic, in no respiratory distress and well hydrated.   HEENT:  NC/AT, pink conj, anicteric sclerae, no nasal congestion or sinus tenderness  Neck:  No cervical lymphadenopathy, no JVD, no thyromegaly, no carotid bruit  Heart:  RRR without M/R/G  Lungs:  CTAB, no rhonchi, rales, or wheezes with good air exchange   Abdomen:  Non-tender, pos bowel sounds, no hepatosplenomegaly  Ext:  No C/C/E    Skin: no rash  Neuro: no lateralizing signs, CNs II-XII intact            I have discussed the diagnosis with the patient and the intended plan as seen in the above orders. The patient has received an After-Visit Summary and questions were answered concerning future plans. Medication Side Effects and Warnings were discussed with patient: yes    Patient verbalized understanding of above instructions.     Kolby Oliver MD  Internal Medicine  [facility]

## 2022-04-04 NOTE — PATIENT INSTRUCTIONS
Chronic Cough: Care Instructions  Overview     A cough is your body's response to something that bothers your throat or airways. Many things can cause a cough. You might cough because of a cold or the flu, bronchitis, or asthma. Smoking, postnasal drip, allergies, and stomach acid that backs up into your throat also can cause a cough. A cough can be short-term (acute) or long-term (chronic). A chronic cough lasts more than 8 weeks. A chronic cough is often caused by a long-term problem, such as asthma. Another cause might be a medicine, such as an ACE inhibitor. A cough is a symptom, not a disease. To treat a chronic cough, you may need to treat the problem that causes it. You can take a few steps at home to cough less and feel better. Some people cough or clear their throat out of habit for no clear reason. Follow-up care is a key part of your treatment and safety. Be sure to make and go to all appointments, and call your doctor if you are having problems. It's also a good idea to know your test results and keep a list of the medicines you take. How can you care for yourself at home? · Drink plenty of water and other fluids. This may help soothe a dry or sore throat. Honey or lemon juice in hot water or tea may ease a dry cough. · Prop up your head on pillows to help you breathe and ease a cough. · Do not smoke or allow others to smoke around you. Smoke can make a cough worse. If you need help quitting, talk to your doctor about stop-smoking programs and medicines. These can increase your chances of quitting for good. · Avoid exposure to smoke, dust, or other pollutants, or wear a face mask. Check with your doctor or pharmacist to find out which type of face mask will give you the most benefit. · Take cough medicine as directed by your doctor. · Try cough drops or hard candy to sooth a dry or sore throat.   Throat clearing  When you have a chronic cough or a disease that may cause this type of cough, you may often feel like you want to clear your throat. This helps bring up mucus. But throat clearing does not always have a cause. Throat clearing can become a habit. The more you do it, the more you feel like you need to do it. But frequent throat clearing can be hard on your vocal cords. It's like slamming them together. To help lessen throat clearing, you can try:  · Taking small sips of water. · Not clearing your throat when you feel you need to. · Swallowing hard when you want to clear your throat. You may want to ask your doctor if a medicine that thins mucus would help. When should you call for help? Call 911 anytime you think you may need emergency care. For example, call if:    · You have severe trouble breathing. Call your doctor now or seek immediate medical care if:    · You cough up blood.     · You have new or worse trouble breathing.     · You have a new or higher fever. Watch closely for changes in your health, and be sure to contact your doctor if:    · You cough more deeply or more often, especially if you notice more mucus or a change in the color of your mucus.     · You do not get better as expected. Where can you learn more? Go to http://www.gray.com/  Enter O384 in the search box to learn more about \"Chronic Cough: Care Instructions. \"  Current as of: October 6, 2021               Content Version: 13.2  © 1745-2517 "[employer]. Care instructions adapted under license by [employer] (which disclaims liability or warranty for this information). If you have questions about a medical condition or this instruction, always ask your healthcare professional. [place] [address] any warranty or liability for your use of this information.

## 2022-04-15 DIAGNOSIS — E03.4 HYPOTHYROIDISM DUE TO ACQUIRED ATROPHY OF THYROID: ICD-10-CM

## 2022-04-15 RX ORDER — LEVOTHYROXINE SODIUM 50 UG/1
TABLET ORAL
Qty: 90 TABLET | Refills: 0 | Status: SHIPPED | OUTPATIENT
Start: 2022-04-15

## 2022-06-08 DIAGNOSIS — J20.9 ACUTE BRONCHITIS, UNSPECIFIED ORGANISM: ICD-10-CM

## 2022-06-08 RX ORDER — ALBUTEROL SULFATE 90 UG/1
1 AEROSOL, METERED RESPIRATORY (INHALATION)
Qty: 1 EACH | Refills: 0 | Status: SHIPPED | OUTPATIENT
Start: 2022-06-08 | End: 2022-07-06

## 2022-08-11 DIAGNOSIS — J20.9 ACUTE BRONCHITIS, UNSPECIFIED ORGANISM: ICD-10-CM

## 2022-08-11 RX ORDER — ALBUTEROL SULFATE 90 UG/1
AEROSOL, METERED RESPIRATORY (INHALATION)
Qty: 18 G | Refills: 0 | Status: SHIPPED | OUTPATIENT
Start: 2022-08-11 | End: 2022-10-20 | Stop reason: SDUPTHER

## 2022-10-20 ENCOUNTER — OFFICE VISIT (OUTPATIENT)
Dept: FAMILY MEDICINE CLINIC | Age: 37
End: 2022-10-20
Payer: COMMERCIAL

## 2022-10-20 VITALS
TEMPERATURE: 98.7 F | WEIGHT: 205.6 LBS | DIASTOLIC BLOOD PRESSURE: 86 MMHG | HEART RATE: 90 BPM | HEIGHT: 62 IN | RESPIRATION RATE: 16 BRPM | SYSTOLIC BLOOD PRESSURE: 127 MMHG | OXYGEN SATURATION: 98 % | BODY MASS INDEX: 37.84 KG/M2

## 2022-10-20 DIAGNOSIS — M25.571 CHRONIC PAIN OF RIGHT ANKLE: Primary | ICD-10-CM

## 2022-10-20 DIAGNOSIS — J20.9 ACUTE BRONCHITIS, UNSPECIFIED ORGANISM: ICD-10-CM

## 2022-10-20 DIAGNOSIS — G89.29 CHRONIC PAIN OF RIGHT ANKLE: Primary | ICD-10-CM

## 2022-10-20 DIAGNOSIS — E03.4 HYPOTHYROIDISM DUE TO ACQUIRED ATROPHY OF THYROID: ICD-10-CM

## 2022-10-20 DIAGNOSIS — Z82.49 FAMILY HISTORY OF MI (MYOCARDIAL INFARCTION): ICD-10-CM

## 2022-10-20 PROCEDURE — 99214 OFFICE O/P EST MOD 30 MIN: CPT | Performed by: INTERNAL MEDICINE

## 2022-10-20 RX ORDER — ALBUTEROL SULFATE 90 UG/1
2 AEROSOL, METERED RESPIRATORY (INHALATION)
Qty: 18 G | Refills: 2 | Status: SHIPPED | OUTPATIENT
Start: 2022-10-20

## 2022-10-20 RX ORDER — LEVOTHYROXINE SODIUM 50 UG/1
TABLET ORAL
Qty: 90 TABLET | Refills: 0 | Status: CANCELLED | OUTPATIENT
Start: 2022-10-20

## 2022-10-20 NOTE — PROGRESS NOTES
Patient seen with c/o Right ankle swelling and pain post injury 3 months prior. Treated at urgent care, xrays negative for fx, Has not followed up for care, limited weight bearing. Health Maintenance Due   Topic Date Due    COVID-19 Vaccine (1) Never done    Cervical cancer screen  03/21/2018    Flu Vaccine (1) 08/01/2022     1. \"Have you been to the ER, urgent care clinic since your last visit? Hospitalized since your last visit? \" Yes Urgent Care for ankle injury    2. \"Have you seen or consulted any other health care providers outside of the [address] [place] [address] since your last visit? \" Yes ENT for sinus procedure    3. For patients aged 39-70: Has the patient had a colonoscopy / FIT/ Cologuard? NA - based on age      If the patient is female:    4. For patients aged 41-77: Has the patient had a mammogram within the past 2 years? NA - based on age or sex      11. For patients aged 21-65: Has the patient had a pap smear?  Yes - no Care Gap present

## 2022-10-20 NOTE — PROGRESS NOTES
Assessment/ Plan:   Diagnoses and all orders for this visit:    1. Chronic pain of right ankle  -     XR ANKLE RT MIN 3 V; Future  -     REFERRAL TO ORTHOPEDICS    2. Acute bronchitis, unspecified organism  -     albuterol (PROVENTIL HFA, VENTOLIN HFA, PROAIR HFA) 90 mcg/actuation inhaler; Take 2 Puffs by inhalation every four (4) hours as needed for Wheezing.  -     CBC WITH AUTOMATED DIFF; Future    3. Hypothyroidism due to acquired atrophy of thyroid  -     METABOLIC PANEL, COMPREHENSIVE; Future  -     TSH 3RD GENERATION; Future    4. Family history of MI (myocardial infarction)  -     LIPID PANEL; Future          Follow-up and Dispositions    Return in about 3 months (around 1/20/2023) for follow up.                    Chief Complaint   Patient presents with    Ankle Injury     right       Pt is a 29y.o. year old female who presents for follow up of her chronic medical problems    Health Maintenance Due   Topic Date Due    COVID-19 Vaccine (1) Never done    Cervical cancer screen  03/21/2018    Flu Vaccine (1)-declined 08/01/2022      3 months ago-fell off 15 ft deck and landed on the right foot/knee  Urgent care the following day-Xrays showed no fracture-aircast, elevated/ice/NSAIDs OTC  Can walk and put weight on it but unable to do inversion or eversion  Cannot go down steps, flexing the foot also hurts    Lab Results   Component Value Date/Time    TSH 4.130 10/20/2022 02:47 PM      Wt Readings from Last 3 Encounters:   10/20/22 205 lb 9.6 oz (93.3 kg)   09/27/22 200 lb (90.7 kg)   04/04/22 196 lb 9.6 oz (89.2 kg)      Saw Pulmo-allergy induced asthma  Was supposed to do more testing but sprained ankle  Follow up next month    Saw ENT-had CT sinuses-allergy shots, fixed septum  Feels better with breathing  Had obstructive pneumonia after the sinus surgery-seen on CTA done at ER    BP Readings from Last 3 Encounters:   10/20/22 127/86   09/27/22 (!) 152/89   04/04/22 120/79    Afraid of having heart prob bec dad had    Lab Results   Component Value Date/Time    Cholesterol, total 158 10/20/2022 02:47 PM    HDL Cholesterol 52 10/20/2022 02:47 PM    LDL, calculated 86 10/20/2022 02:47 PM    LDL, calculated 79 02/20/2020 10:20 AM    VLDL, calculated 20 10/20/2022 02:47 PM    VLDL, calculated 13 02/20/2020 10:20 AM    Triglyceride 112 10/20/2022 02:47 PM        Quit vaping        ROS:    Pt denies: Wt loss, Fever/Chills, HA, Visual changes, Fatigue, Chest pain, SOB, KEEN, Abd pain, N/V/D/C, Blood in stool or urine, Edema. Pertinent positive as above in HPI. All others were negative    Patient Active Problem List   Diagnosis Code    Hypothyroidism due to acquired atrophy of thyroid E03.4    Anxiety F41.9    Hirsutism L68.0    Obesity (BMI 30.0-34. 9) E66.9    External hemorrhoids K64.4    Family history of MI (myocardial infarction) Z82.49    Moderate episode of recurrent major depressive disorder (HCC) F33.1    Hypothyroidism E03.9       Past Medical History:   Diagnosis Date    Anxiety     Asthma     Depression 1 year    anxiety     Thyroid disease        Current Outpatient Medications   Medication Sig Dispense Refill    albuterol (PROVENTIL HFA, VENTOLIN HFA, PROAIR HFA) 90 mcg/actuation inhaler Take 2 Puffs by inhalation every four (4) hours as needed for Wheezing. 18 g 2    guaiFENesin (Mucinex) 1,200 mg Ta12 ER tablet Take 1,200 mg by mouth two (2) times a day. levothyroxine (SYNTHROID) 50 mcg tablet TAKE 1 TABLET BY MOUTH DAILY BEFORE AND BREAKFAST 90 Tablet 0       Social History     Tobacco Use   Smoking Status Never   Smokeless Tobacco Never       No Known Allergies    Patient Labs were reviewed: yes    Patient Past Records were reviewed: yes      Objective:     Vitals:    10/20/22 1340   BP: 127/86   Pulse: 90   Resp: 16   Temp: 98.7 °F (37.1 °C)   TempSrc: Temporal   SpO2: 98%   Weight: 205 lb 9.6 oz (93.3 kg)   Height: 5' 2\" (1.575 m)     Body mass index is 37.6 kg/m².     Exam:   Appearance: alert, well appearing,  oriented to person, place, and time, acyanotic, in no respiratory distress and well hydrated. HEENT:  NC/AT, pink conj, anicteric sclerae  Neck:  No cervical lymphadenopathy, no JVD, no thyromegaly, no carotid bruit  Heart:  RRR without M/R/G  Lungs:  CTAB, no rhonchi, rales, or wheezes with good air exchange   Abdomen:  Non-tender, pos bowel sounds, no hepatosplenomegaly  Ext:  No C/C/E    Skin: no rash  Neuro: no lateralizing signs, CNs II-XII intact            I have discussed the diagnosis with the patient and the intended plan as seen in the above orders. The patient has received an After-Visit Summary and questions were answered concerning future plans. Medication Side Effects and Warnings were discussed with patient: yes    Patient verbalized understanding of above instructions.     Shama House MD  Internal Medicine  [facility]

## 2022-10-21 LAB
ALBUMIN SERPL-MCNC: 4.8 G/DL (ref 3.8–4.8)
ALBUMIN/GLOB SERPL: 2 {RATIO} (ref 1.2–2.2)
ALP SERPL-CCNC: 97 IU/L (ref 44–121)
ALT SERPL-CCNC: 17 IU/L (ref 0–32)
AST SERPL-CCNC: 14 IU/L (ref 0–40)
BASOPHILS # BLD AUTO: 0.1 X10E3/UL (ref 0–0.2)
BASOPHILS NFR BLD AUTO: 1 %
BILIRUB SERPL-MCNC: 0.2 MG/DL (ref 0–1.2)
BUN SERPL-MCNC: 7 MG/DL (ref 6–20)
BUN/CREAT SERPL: 13 (ref 9–23)
CALCIUM SERPL-MCNC: 10.3 MG/DL (ref 8.7–10.2)
CHLORIDE SERPL-SCNC: 102 MMOL/L (ref 96–106)
CHOLEST SERPL-MCNC: 158 MG/DL (ref 100–199)
CO2 SERPL-SCNC: 21 MMOL/L (ref 20–29)
CREAT SERPL-MCNC: 0.56 MG/DL (ref 0.57–1)
EGFR: 122 ML/MIN/1.73
EOSINOPHIL # BLD AUTO: 0.6 X10E3/UL (ref 0–0.4)
EOSINOPHIL NFR BLD AUTO: 4 %
ERYTHROCYTE [DISTWIDTH] IN BLOOD BY AUTOMATED COUNT: 12.5 % (ref 11.7–15.4)
GLOBULIN SER CALC-MCNC: 2.4 G/DL (ref 1.5–4.5)
GLUCOSE SERPL-MCNC: 80 MG/DL (ref 70–99)
HCT VFR BLD AUTO: 40.3 % (ref 34–46.6)
HDLC SERPL-MCNC: 52 MG/DL
HGB BLD-MCNC: 13.5 G/DL (ref 11.1–15.9)
IMM GRANULOCYTES # BLD AUTO: 0 X10E3/UL (ref 0–0.1)
IMM GRANULOCYTES NFR BLD AUTO: 0 %
IMP & REVIEW OF LAB RESULTS: NORMAL
LDLC SERPL CALC-MCNC: 86 MG/DL (ref 0–99)
LYMPHOCYTES # BLD AUTO: 4.4 X10E3/UL (ref 0.7–3.1)
LYMPHOCYTES NFR BLD AUTO: 29 %
MCH RBC QN AUTO: 29.4 PG (ref 26.6–33)
MCHC RBC AUTO-ENTMCNC: 33.5 G/DL (ref 31.5–35.7)
MCV RBC AUTO: 88 FL (ref 79–97)
MONOCYTES # BLD AUTO: 0.9 X10E3/UL (ref 0.1–0.9)
MONOCYTES NFR BLD AUTO: 6 %
NEUTROPHILS # BLD AUTO: 9 X10E3/UL (ref 1.4–7)
NEUTROPHILS NFR BLD AUTO: 60 %
PLATELET # BLD AUTO: 437 X10E3/UL (ref 150–450)
POTASSIUM SERPL-SCNC: 4.3 MMOL/L (ref 3.5–5.2)
PROT SERPL-MCNC: 7.2 G/DL (ref 6–8.5)
RBC # BLD AUTO: 4.59 X10E6/UL (ref 3.77–5.28)
SODIUM SERPL-SCNC: 138 MMOL/L (ref 134–144)
TRIGL SERPL-MCNC: 112 MG/DL (ref 0–149)
TSH SERPL DL<=0.005 MIU/L-ACNC: 4.13 UIU/ML (ref 0.45–4.5)
VLDLC SERPL CALC-MCNC: 20 MG/DL (ref 5–40)
WBC # BLD AUTO: 15 X10E3/UL (ref 3.4–10.8)

## 2022-11-01 ENCOUNTER — TELEPHONE (OUTPATIENT)
Dept: FAMILY MEDICINE CLINIC | Age: 37
End: 2022-11-01

## 2022-11-01 NOTE — TELEPHONE ENCOUNTER
[id number] [facility] Nurses Pool  Subject: Results Request     QUESTIONS   Results: radiology and lab results ;  Ordered by: Ivanna Uribe   Date Performed: 2022-10-20   ---------------------------------------------------------------------------   --------------   Hayley GRAVES     [phone]; OK to leave message on voicemail

## 2022-11-15 DIAGNOSIS — D72.829 LEUKOCYTOSIS, UNSPECIFIED TYPE: Primary | ICD-10-CM

## 2022-11-18 ENCOUNTER — OFFICE VISIT (OUTPATIENT)
Dept: SPORTS MEDICINE | Age: 37
End: 2022-11-18
Payer: COMMERCIAL

## 2022-11-18 DIAGNOSIS — M25.371 ANKLE INSTABILITY, RIGHT: ICD-10-CM

## 2022-11-18 DIAGNOSIS — M25.571 CHRONIC PAIN OF RIGHT ANKLE: Primary | ICD-10-CM

## 2022-11-18 DIAGNOSIS — G89.29 CHRONIC PAIN OF RIGHT ANKLE: Primary | ICD-10-CM

## 2022-11-18 PROCEDURE — 99203 OFFICE O/P NEW LOW 30 MIN: CPT | Performed by: FAMILY MEDICINE

## 2022-11-18 NOTE — PROGRESS NOTES
[address] [facility]  Sports Medicine Consultation Note    PCP: Carri Peng MD  Requesting provider: Carri Peng MD        Nba Camejo is a 29 y.o. female (: 1985) presenting to obtain consultative services regarding:  Chief Complaint   Patient presents with    Ankle Pain     Right       Assessment/Plan:       ICD-10-CM ICD-9-CM   1. Chronic pain of right ankle  M25.571 719.47    G89.29 338.29   2. Ankle instability, right  M25.371 718.87       Impression:  # chronic RIGHT ankle pain 4mo s/p inversion injury  # RIGHT ankle relative instability    Plan:  Educated patient at length regarding condition.  > Start home exercise plan, focusing on strength and proprioception  > Ice as needed  > Offered formal physical therapy, however patient declined at this time  > Advised if unsatisfactory improvement after 2-3 months, return to office for repeat eval        No orders of the defined types were placed in this encounter. Management plan & patient instructions discussed with Nba Camejo, who voiced understanding. Thank you for the opportunity to participate in the care of this patient. If any questions or concerns at all, please feel free to contact me. This document may have been created with the aid of dictation software. Text may contain errors, particularly phonetic errors. Anna Perrin MD  Internal Medicine, Family Medicine & Sports Medicine  2022      Subjective   History:     I was asked to provide consultative services by Carri Peng MD  for advice/opinion related to evaluating    Chief Complaint   Patient presents with    Ankle Pain     Right       Nba Camejo is a 29 y.o. female      4 months ago, fell off a 10 foot high porch, sustaining inversion injury to right ankle. [facility] read as [facility]. Given Aircast.  Noted difficulty with weightbearing for 3 to 4 weeks.   Had significant amount of bruising, has since resolved. Had significant swelling, also has since resolved. Now reports a dull ache on the lateral aspect of the right ankle, and occasionally some sharp pain on the medial aspect. Also endorses some weakness. Continues to refrain from ascending/descending stairs reciprocally, due to discomfort and \"feeling uncertain\". Aggravating factors:   Weightbearing, particularly stairs    Alleviating factors:   Rest    Neurologic symptoms:   No numbness, tingling  Positive for weakness  No bowel or bladder changes. Previous work-up for this complaint has included:   Weightbearing x-rays from 2022, x-rays with urgent care in 2022    Past Medical History:   Diagnosis Date    Anxiety     Asthma     Depression 1 year    anxiety     Thyroid disease      Past Surgical History:   Procedure Laterality Date    HX BILATERAL SALPINGECTOMY Bilateral     MULTIPLE DELIVERY         reports that she has never smoked. She has never used smokeless tobacco. She reports current alcohol use of about 2.0 standard drinks per week. She reports that she does not use drugs. Family History   Problem Relation Age of Onset    Cancer Maternal Grandmother         bone     Allergies   Allergen Reactions    Hydrocodone Nausea and Vomiting       Problem List:      Patient Active Problem List    Diagnosis    External hemorrhoids    Family history of MI (myocardial infarction)    Moderate episode of recurrent major depressive disorder ([facility] [address]    Hypothyroidism    Hirsutism    Obesity (BMI 30.0-34. 9)    Hypothyroidism due to acquired atrophy of thyroid    Anxiety       Medications:     Current Outpatient Medications on File Prior to Visit   Medication Sig Dispense Refill    albuterol (PROVENTIL HFA, VENTOLIN HFA, PROAIR HFA) 90 mcg/actuation inhaler Take 2 Puffs by inhalation every four (4) hours as needed for Wheezing.  18 g 2    guaiFENesin (Mucinex) 1,200 mg Ta12 ER tablet Take 1,200 mg by mouth two (2) times a day.      levothyroxine (SYNTHROID) 50 mcg tablet TAKE 1 TABLET BY MOUTH DAILY BEFORE AND BREAKFAST 90 Tablet 0     No current facility-administered medications on file prior to visit. Objective   Physical Assessment:   VS:    Vitals:    11/18/22 1604   PainSc:   0 - No pain   PainLoc: Ankle       Physical Exam  Nursing note reviewed. Constitutional:       General: She is not in acute distress. Appearance: Normal appearance. She is well-developed. She is obese. She is not diaphoretic. HENT:      Head: Normocephalic and atraumatic. Eyes:      Conjunctiva/sclera: Conjunctivae normal.   Musculoskeletal:      Cervical back: Neck supple. Right ankle: No swelling or ecchymosis. Tenderness (trace tenderness over medial gutter) present over the ATF ligament (mild). No lateral malleolus, medial malleolus, AITF ligament, CF ligament, posterior TF ligament, base of 5th metatarsal or proximal fibula tenderness. Normal range of motion. Anterior drawer test negative. Right Achilles Tendon: No tenderness or defects. Palomares's test negative. Left ankle: No swelling or ecchymosis. No tenderness. No lateral malleolus, medial malleolus, ATF ligament, AITF ligament, CF ligament, posterior TF ligament, base of 5th metatarsal or proximal fibula tenderness. Normal range of motion. Anterior drawer test negative. Left Achilles Tendon: No tenderness or defects. Palomares's test negative. Feet:       Comments: 4/5 RIGHT ankle eversion, PTT strength; 5/5 LEFT; decreased weightbearing proprioception on RIGHT    Skin:     General: Skin is warm and dry. Findings: No rash. Neurological:      Mental Status: She is alert and oriented to person, place, and time. Psychiatric:         Behavior: Behavior normal. Behavior is cooperative. Thought Content: Thought content normal.       Recent Labs & Imaging:     XR Results (maximum last 3):   Results from Appointment encounter on 10/20/22    XR ANKLE RT MIN 3 V    Narrative  EXAM: XR ANKLE RT MIN 3 V    INDICATION: Injury 3 months ago, persistent pain and swelling    COMPARISON: None. FINDINGS: Three views of the right ankle demonstrate subtle cortical  irregularity at medial margin of the base of the talar dome. . No other osseous,  articular or soft tissue abnormalities are identified. Impression  Possibility of a small avulsion injury at the base of the medial  talar dome not excluded.           Review of Previous Medical Records:     [facility]

## 2022-11-18 NOTE — LETTER
11/19/2022    Patient: Jayden Donahue   YOB: 1985   Date of Visit: 11/18/2022     Ruthy Olivares MD  [address] [address]  [place]    Dear Ruthy Olivares MD,      Thank you for referring Ms. Kimberley Alcaraz to [facility] for evaluation. My notes for this consultation are attached. If you have questions, please do not hesitate to call me. I look forward to following your patient along with you.       Sincerely,    Charmaine Abarca MD

## 2022-11-18 NOTE — PATIENT INSTRUCTIONS
Your goals are:  - to normalize your range of motion (move around)  - to strengthen your ankle   - to make it smarter (your ankle knowing when the ground is flat and when it isn't)    Use ice as needed for swelling or discomfort. VISIT SURVEY       You may receive a survey regarding your visit today either by mail or email. Please take the opportunity to let us know how we did. This information helps us continue to improve and provide a great patient experience.

## 2022-12-10 ENCOUNTER — TELEPHONE (OUTPATIENT)
Dept: FAMILY MEDICINE CLINIC | Age: 37
End: 2022-12-10

## 2022-12-10 NOTE — PROGRESS NOTES
Patient called in to answering service after having cold symptoms starting yesterday and tested positive for Covid-19 with an at-home test. Pt has history of asthma and states has used her Albuterol as needed with relief. Denies chest pain or shortness of breath and is no acute distress. Will prescribe Paxlovid-advised if any worsening of symptoms to call back to answering service or proceed to ER if any acute distress. Pt voiced understanding and advised to quarantine for 5 days.

## 2023-05-04 ENCOUNTER — OFFICE VISIT (OUTPATIENT)
Facility: CLINIC | Age: 38
End: 2023-05-04

## 2023-05-04 VITALS
SYSTOLIC BLOOD PRESSURE: 121 MMHG | BODY MASS INDEX: 38.83 KG/M2 | HEART RATE: 87 BPM | WEIGHT: 211 LBS | HEIGHT: 62 IN | DIASTOLIC BLOOD PRESSURE: 82 MMHG | RESPIRATION RATE: 16 BRPM | TEMPERATURE: 99.2 F

## 2023-05-04 DIAGNOSIS — Z91.09 MULTIPLE ENVIRONMENTAL ALLERGIES: ICD-10-CM

## 2023-05-04 DIAGNOSIS — G89.29 CHRONIC PAIN OF RIGHT ANKLE: ICD-10-CM

## 2023-05-04 DIAGNOSIS — E03.4 ATROPHY OF THYROID (ACQUIRED): ICD-10-CM

## 2023-05-04 DIAGNOSIS — R92.8 ABNORMALITY OF LEFT BREAST ON SCREENING MAMMOGRAM: ICD-10-CM

## 2023-05-04 DIAGNOSIS — Z11.4 SCREENING FOR HUMAN IMMUNODEFICIENCY VIRUS: ICD-10-CM

## 2023-05-04 DIAGNOSIS — R22.1 MASS OF RIGHT SIDE OF NECK: Primary | ICD-10-CM

## 2023-05-04 DIAGNOSIS — F33.1 MAJOR DEPRESSIVE DISORDER, RECURRENT, MODERATE (HCC): ICD-10-CM

## 2023-05-04 DIAGNOSIS — M25.571 CHRONIC PAIN OF RIGHT ANKLE: ICD-10-CM

## 2023-05-04 ASSESSMENT — PATIENT HEALTH QUESTIONNAIRE - PHQ9
SUM OF ALL RESPONSES TO PHQ QUESTIONS 1-9: 0
SUM OF ALL RESPONSES TO PHQ9 QUESTIONS 1 & 2: 0
1. LITTLE INTEREST OR PLEASURE IN DOING THINGS: 0
SUM OF ALL RESPONSES TO PHQ QUESTIONS 1-9: 0
2. FEELING DOWN, DEPRESSED OR HOPELESS: 0

## 2023-05-05 LAB
ALBUMIN SERPL-MCNC: 4.6 G/DL (ref 3.8–4.8)
ALBUMIN/GLOB SERPL: 1.9 {RATIO} (ref 1.2–2.2)
ALP SERPL-CCNC: 85 IU/L (ref 44–121)
ALT SERPL-CCNC: 16 IU/L (ref 0–32)
AST SERPL-CCNC: 16 IU/L (ref 0–40)
BILIRUB SERPL-MCNC: 0.3 MG/DL (ref 0–1.2)
BUN SERPL-MCNC: 11 MG/DL (ref 6–20)
BUN/CREAT SERPL: 18 (ref 9–23)
CALCIUM SERPL-MCNC: 9.7 MG/DL (ref 8.7–10.2)
CHLORIDE SERPL-SCNC: 101 MMOL/L (ref 96–106)
CO2 SERPL-SCNC: 20 MMOL/L (ref 20–29)
CREAT SERPL-MCNC: 0.62 MG/DL (ref 0.57–1)
EGFRCR SERPLBLD CKD-EPI 2021: 118 ML/MIN/1.73
ERYTHROCYTE [DISTWIDTH] IN BLOOD BY AUTOMATED COUNT: 12.6 % (ref 11.7–15.4)
ERYTHROCYTE [SEDIMENTATION RATE] IN BLOOD BY WESTERGREN METHOD: 24 MM/HR (ref 0–32)
GLOBULIN SER CALC-MCNC: 2.4 G/DL (ref 1.5–4.5)
GLUCOSE SERPL-MCNC: 97 MG/DL (ref 70–99)
HCT VFR BLD AUTO: 40.8 % (ref 34–46.6)
HGB BLD-MCNC: 13.5 G/DL (ref 11.1–15.9)
HIV 1+2 AB+HIV1 P24 AG SERPL QL IA: NON REACTIVE
MCH RBC QN AUTO: 29.2 PG (ref 26.6–33)
MCHC RBC AUTO-ENTMCNC: 33.1 G/DL (ref 31.5–35.7)
MCV RBC AUTO: 88 FL (ref 79–97)
PLATELET # BLD AUTO: 394 X10E3/UL (ref 150–450)
POTASSIUM SERPL-SCNC: 4.1 MMOL/L (ref 3.5–5.2)
PROT SERPL-MCNC: 7 G/DL (ref 6–8.5)
RBC # BLD AUTO: 4.63 X10E6/UL (ref 3.77–5.28)
SODIUM SERPL-SCNC: 138 MMOL/L (ref 134–144)
TSH SERPL DL<=0.005 MIU/L-ACNC: 2.84 UIU/ML (ref 0.45–4.5)
WBC # BLD AUTO: 12.2 X10E3/UL (ref 3.4–10.8)

## 2023-05-08 DIAGNOSIS — D72.829 LEUKOCYTOSIS, UNSPECIFIED TYPE: ICD-10-CM

## 2023-05-08 DIAGNOSIS — R22.1 MASS OF RIGHT SIDE OF NECK: Primary | ICD-10-CM

## 2023-06-22 ENCOUNTER — TELEPHONE (OUTPATIENT)
Facility: CLINIC | Age: 38
End: 2023-06-22

## 2023-08-28 ENCOUNTER — OFFICE VISIT (OUTPATIENT)
Facility: CLINIC | Age: 38
End: 2023-08-28
Payer: COMMERCIAL

## 2023-08-28 VITALS
OXYGEN SATURATION: 99 % | SYSTOLIC BLOOD PRESSURE: 124 MMHG | BODY MASS INDEX: 38.28 KG/M2 | DIASTOLIC BLOOD PRESSURE: 80 MMHG | TEMPERATURE: 98.6 F | HEIGHT: 62 IN | HEART RATE: 77 BPM | RESPIRATION RATE: 16 BRPM | WEIGHT: 208 LBS

## 2023-08-28 DIAGNOSIS — J01.90 ACUTE NON-RECURRENT SINUSITIS, UNSPECIFIED LOCATION: Primary | ICD-10-CM

## 2023-08-28 DIAGNOSIS — K21.9 GASTROESOPHAGEAL REFLUX DISEASE, UNSPECIFIED WHETHER ESOPHAGITIS PRESENT: ICD-10-CM

## 2023-08-28 DIAGNOSIS — J45.20 MILD INTERMITTENT EXTRINSIC ASTHMA WITHOUT COMPLICATION: ICD-10-CM

## 2023-08-28 DIAGNOSIS — R63.5 WEIGHT GAIN, ABNORMAL: ICD-10-CM

## 2023-08-28 PROCEDURE — 99214 OFFICE O/P EST MOD 30 MIN: CPT | Performed by: INTERNAL MEDICINE

## 2023-08-28 RX ORDER — ALBUTEROL SULFATE 90 UG/1
2 AEROSOL, METERED RESPIRATORY (INHALATION) EVERY 4 HOURS PRN
Qty: 18 G | Refills: 1 | Status: SHIPPED | OUTPATIENT
Start: 2023-08-28

## 2023-08-28 RX ORDER — AMOXICILLIN AND CLAVULANATE POTASSIUM 875; 125 MG/1; MG/1
1 TABLET, FILM COATED ORAL 2 TIMES DAILY
Qty: 14 TABLET | Refills: 0 | Status: SHIPPED | OUTPATIENT
Start: 2023-08-28 | End: 2023-09-04

## 2023-08-28 RX ORDER — OMEPRAZOLE 40 MG/1
40 CAPSULE, DELAYED RELEASE ORAL
Qty: 90 CAPSULE | Refills: 1 | Status: SHIPPED | OUTPATIENT
Start: 2023-08-28

## 2023-08-28 SDOH — ECONOMIC STABILITY: FOOD INSECURITY: WITHIN THE PAST 12 MONTHS, YOU WORRIED THAT YOUR FOOD WOULD RUN OUT BEFORE YOU GOT MONEY TO BUY MORE.: NEVER TRUE

## 2023-08-28 SDOH — ECONOMIC STABILITY: HOUSING INSECURITY
IN THE LAST 12 MONTHS, WAS THERE A TIME WHEN YOU DID NOT HAVE A STEADY PLACE TO SLEEP OR SLEPT IN A SHELTER (INCLUDING NOW)?: NO

## 2023-08-28 SDOH — ECONOMIC STABILITY: FOOD INSECURITY: WITHIN THE PAST 12 MONTHS, THE FOOD YOU BOUGHT JUST DIDN'T LAST AND YOU DIDN'T HAVE MONEY TO GET MORE.: NEVER TRUE

## 2023-08-28 SDOH — ECONOMIC STABILITY: INCOME INSECURITY: HOW HARD IS IT FOR YOU TO PAY FOR THE VERY BASICS LIKE FOOD, HOUSING, MEDICAL CARE, AND HEATING?: NOT VERY HARD

## 2023-08-28 ASSESSMENT — PATIENT HEALTH QUESTIONNAIRE - PHQ9
SUM OF ALL RESPONSES TO PHQ QUESTIONS 1-9: 1
1. LITTLE INTEREST OR PLEASURE IN DOING THINGS: 0
SUM OF ALL RESPONSES TO PHQ9 QUESTIONS 1 & 2: 1
SUM OF ALL RESPONSES TO PHQ QUESTIONS 1-9: 1
2. FEELING DOWN, DEPRESSED OR HOPELESS: 1

## 2023-08-28 NOTE — PROGRESS NOTES
1. \"Have you been to the ER, urgent care clinic since your last visit? Hospitalized since your last visit? \" No    2. \"Have you seen or consulted any other health care providers outside of the [address] [place] [address] since your last visit? \" Yes follow up with Oncology    3. For patients aged 43-73: Has the patient had a colonoscopy / FIT/ Cologuard? N/A      If the patient is female:    4. For patients aged 43-66: Has the patient had a mammogram within the past 2 years? NA - based on age or sex    11. For patients aged 21-65: Has the patient had a pap smear? Yes - Care Gap present.  Most recent result on file    Health Maintenance Due   Topic Date Due    COVID-19 Vaccine (1) Never done    Varicella vaccine (1 of 2 - 2-dose childhood series) Never done    Cervical cancer screen  03/21/2018    Flu vaccine (1) 08/01/2023
anicteric sclerae  Neck:  No cervical lymphadenopathy, no JVD, no thyromegaly, no carotid bruit  Heart:  RRR without M/R/G  Lungs:  CTAB, no rhonchi, rales, or wheezes with good air exchange   Abdomen:  Non-tender, pos bowel sounds, no hepatosplenomegaly  Ext:  No C/C/E    Skin: no rash  Neuro: no lateralizing signs, CNs II-XII intact            I have discussed the diagnosis with the patient and the intended plan as seen in the above orders. The patient has received an After-Visit Summary and questions were answered concerning future plans. Medication Side Effects and Warnings were discussed with patient: yes    Patient verbalized understanding of above instructions.     Bevin Lefort, MD  Internal Medicine  [facility]

## 2023-10-15 DIAGNOSIS — J45.20 MILD INTERMITTENT EXTRINSIC ASTHMA WITHOUT COMPLICATION: ICD-10-CM

## 2023-10-16 RX ORDER — ALBUTEROL SULFATE 90 UG/1
2 AEROSOL, METERED RESPIRATORY (INHALATION) EVERY 4 HOURS PRN
Qty: 18 G | Refills: 1 | Status: SHIPPED | OUTPATIENT
Start: 2023-10-16

## 2024-01-31 ENCOUNTER — TELEPHONE (OUTPATIENT)
Facility: CLINIC | Age: 39
End: 2024-01-31

## 2024-01-31 NOTE — TELEPHONE ENCOUNTER
Called patient to schedule an appointment for her headaches. There was no answer. Unable to leave message as mailbox was full.

## 2024-02-08 ENCOUNTER — OFFICE VISIT (OUTPATIENT)
Facility: CLINIC | Age: 39
End: 2024-02-08
Payer: COMMERCIAL

## 2024-02-08 VITALS
HEIGHT: 62 IN | RESPIRATION RATE: 16 BRPM | HEART RATE: 83 BPM | DIASTOLIC BLOOD PRESSURE: 84 MMHG | TEMPERATURE: 98.7 F | BODY MASS INDEX: 36.99 KG/M2 | WEIGHT: 201 LBS | SYSTOLIC BLOOD PRESSURE: 122 MMHG | OXYGEN SATURATION: 96 %

## 2024-02-08 DIAGNOSIS — Z82.49 FAMILY HISTORY OF ISCHEMIC HEART DISEASE AND OTHER DISEASES OF THE CIRCULATORY SYSTEM: ICD-10-CM

## 2024-02-08 DIAGNOSIS — R07.9 CHEST PAIN, UNSPECIFIED TYPE: ICD-10-CM

## 2024-02-08 DIAGNOSIS — G44.52 NEW DAILY PERSISTENT HEADACHE: Primary | ICD-10-CM

## 2024-02-08 DIAGNOSIS — K64.4 EXTERNAL HEMORRHOIDS: ICD-10-CM

## 2024-02-08 DIAGNOSIS — J45.20 MILD INTERMITTENT EXTRINSIC ASTHMA WITHOUT COMPLICATION: ICD-10-CM

## 2024-02-08 PROCEDURE — 99214 OFFICE O/P EST MOD 30 MIN: CPT | Performed by: INTERNAL MEDICINE

## 2024-02-08 RX ORDER — RIMEGEPANT SULFATE 75 MG/75MG
75 TABLET, ORALLY DISINTEGRATING ORAL EVERY OTHER DAY
Qty: 15 TABLET | Refills: 3 | Status: SHIPPED | OUTPATIENT
Start: 2024-02-08

## 2024-02-08 RX ORDER — ALBUTEROL SULFATE 90 UG/1
2 AEROSOL, METERED RESPIRATORY (INHALATION) EVERY 4 HOURS PRN
Qty: 18 G | Refills: 1 | Status: SHIPPED | OUTPATIENT
Start: 2024-02-08

## 2024-02-08 NOTE — PROGRESS NOTES
\"Have you been to the ER, urgent care clinic since your last visit?  Hospitalized since your last visit?\"    NO    “Have you seen or consulted any other health care providers outside of [facility] since your last visit?”    NO        “Have you had a pap smear?”    NO Patient informed      
series) Never done    COVID-19 Vaccine (1) Never done    Varicella vaccine (1 of 2 - 2-dose childhood series) Never done    Cervical cancer screen  2018    Flu vaccine (1) 2023      Wt Readings from Last 3 Encounters:   24 91.2 kg (201 lb)   23 94.3 kg (208 lb)   23 95.7 kg (211 lb)        BP Readings from Last 3 Encounters:   24 122/84   23 124/80   23 121/82        Saw a different Endo-prob PCOs, follow up appt coming up  Spironolactone for hair growth but would rather not take this-not big on taking pills  ?Contrave did not help her lose weight and made her feel bad      No hx of migraines  Has it daily  Back of the head  Eye exam? Wears eyeglasses-due for annual exam, appt next month  Takes OTC meds that may or may not help  Constant, not throbbing  Lasts for a few hours  No photophobia or phonophobia          Would like to see Cardio-gets short of breath easily and it is not from her lungs- dx recently with allergy induced asthma  Whole body feels tight  ?nerves  10/2021-EKG showed NSR  Farther  of MI at 60; maternal grandmother in her 36 ; maternal uncle in his 40's    Saw ENT for allergy testing  Had sinus surgery-did not help; still gets sinus infections  Also with dizziness, airhead type    Lab Results   Component Value Date/Time    CHOL 152 2024 12:00 AM    CHOL 158 10/20/2022 02:47 PM    TRIG 74 2024 12:00 AM    HDL 41 2024 12:00 AM    LDLCALC 97 2024 12:00 AM    ?fasting -yes  No diabetes  Thyroid was normal    Saw GI-had hemorrhoidal band but hemorrhoids are back and bleed at times  ROS:    Pt denies: Wt loss, Fever/Chills, HA, Visual changes, Fatigue, Chest pain, SOB, SANDERS, Abd pain, N/V/D/C, Blood in stool or urine, Edema. Pertinent positive as above in HPI. All others were negative    Patient Active Problem List   Diagnosis    Anxiety    Moderate episode of recurrent major depressive disorder (HCC)    Obesity (BMI 30.0-34.9)

## 2024-02-09 LAB
CHOLEST SERPL-MCNC: 152 MG/DL (ref 100–199)
ERYTHROCYTE [DISTWIDTH] IN BLOOD BY AUTOMATED COUNT: 13.1 % (ref 11.7–15.4)
HCT VFR BLD AUTO: 42.6 % (ref 34–46.6)
HDLC SERPL-MCNC: 41 MG/DL
HGB BLD-MCNC: 13.5 G/DL (ref 11.1–15.9)
LDLC SERPL CALC-MCNC: 97 MG/DL (ref 0–99)
MCH RBC QN AUTO: 29 PG (ref 26.6–33)
MCHC RBC AUTO-ENTMCNC: 31.7 G/DL (ref 31.5–35.7)
MCV RBC AUTO: 91 FL (ref 79–97)
PLATELET # BLD AUTO: 369 X10E3/UL (ref 150–450)
RBC # BLD AUTO: 4.66 X10E6/UL (ref 3.77–5.28)
TRIGL SERPL-MCNC: 74 MG/DL (ref 0–149)
VLDLC SERPL CALC-MCNC: 14 MG/DL (ref 5–40)
WBC # BLD AUTO: 8.9 X10E3/UL (ref 3.4–10.8)

## 2024-03-05 DIAGNOSIS — G44.52 NEW DAILY PERSISTENT HEADACHE: Primary | ICD-10-CM

## 2024-03-05 RX ORDER — RIMEGEPANT SULFATE 75 MG/75MG
75 TABLET, ORALLY DISINTEGRATING ORAL DAILY PRN
Qty: 8 TABLET | Refills: 2 | Status: SHIPPED | OUTPATIENT
Start: 2024-03-05 | End: 2024-06-03

## 2024-04-14 DIAGNOSIS — J45.20 MILD INTERMITTENT EXTRINSIC ASTHMA WITHOUT COMPLICATION: ICD-10-CM

## 2024-04-15 RX ORDER — ALBUTEROL SULFATE 90 UG/1
AEROSOL, METERED RESPIRATORY (INHALATION)
Qty: 18 G | Refills: 1 | Status: SHIPPED | OUTPATIENT
Start: 2024-04-15

## 2024-09-17 NOTE — PROGRESS NOTES
Patient states she had a covid vaccine test 3 weeks ago. Patient states her last menstrual cycle was January 2022. Héctor Chawla presents today for   Chief Complaint   Patient presents with    Cough     2 month     Cold Symptoms       Virtual/telephone visit    Depression Screening:  3 most recent PHQ Screens 1/13/2022   Little interest or pleasure in doing things Not at all   Feeling down, depressed, irritable, or hopeless Not at all   Total Score PHQ 2 0   Trouble falling or staying asleep, or sleeping too much -   Feeling tired or having little energy -   Poor appetite, weight loss, or overeating -   Feeling bad about yourself - or that you are a failure or have let yourself or your family down -   Trouble concentrating on things such as school, work, reading, or watching TV -   Moving or speaking so slowly that other people could have noticed; or the opposite being so fidgety that others notice -   Thoughts of being better off dead, or hurting yourself in some way -   PHQ 9 Score -   How difficult have these problems made it for you to do your work, take care of your home and get along with others -       Learning Assessment:  Learning Assessment 4/10/2019   PRIMARY LEARNER Patient   HIGHEST LEVEL OF EDUCATION - PRIMARY LEARNER  2 YEARS OF COLLEGE   BARRIERS PRIMARY LEARNER NONE   CO-LEARNER CAREGIVER No   PRIMARY LANGUAGE ENGLISH   LEARNER PREFERENCE PRIMARY DEMONSTRATION   ANSWERED BY self    RELATIONSHIP SELF       Health Maintenance reviewed and discussed and ordered per Provider. Health Maintenance Due   Topic Date Due    COVID-19 Vaccine (1) Never done    Cervical cancer screen  03/21/2018   . Coordination of Care:  1. Have you been to the ER, urgent care clinic since your last visit? Hospitalized since your last visit? no    2. Have you seen or consulted any other health care providers outside of the [address] [place] [address] since your last visit?  Include any pap smears or colon [FreeTextEntry1] : 40 year old presenting for follow up s/p medical management for blighted ovum -patient is no longer bleeding -f/u cbc and hcg drawn today  -if HCG significantly positive, patient to RTO  -f/u PRN  screening.  no

## 2024-10-08 ENCOUNTER — OFFICE VISIT (OUTPATIENT)
Facility: CLINIC | Age: 39
End: 2024-10-08
Payer: COMMERCIAL

## 2024-10-08 VITALS
RESPIRATION RATE: 16 BRPM | TEMPERATURE: 98.6 F | DIASTOLIC BLOOD PRESSURE: 81 MMHG | WEIGHT: 207.4 LBS | HEART RATE: 78 BPM | BODY MASS INDEX: 38.16 KG/M2 | HEIGHT: 62 IN | OXYGEN SATURATION: 97 % | SYSTOLIC BLOOD PRESSURE: 131 MMHG

## 2024-10-08 DIAGNOSIS — N89.8 VAGINAL DISCHARGE: ICD-10-CM

## 2024-10-08 DIAGNOSIS — J45.20 MILD INTERMITTENT EXTRINSIC ASTHMA WITHOUT COMPLICATION: ICD-10-CM

## 2024-10-08 DIAGNOSIS — F33.1 MAJOR DEPRESSIVE DISORDER, RECURRENT, MODERATE (HCC): ICD-10-CM

## 2024-10-08 DIAGNOSIS — E66.01 SEVERE OBESITY (BMI 35.0-39.9) WITH COMORBIDITY: ICD-10-CM

## 2024-10-08 DIAGNOSIS — K64.4 EXTERNAL HEMORRHOIDS: ICD-10-CM

## 2024-10-08 DIAGNOSIS — E03.4 ATROPHY OF THYROID (ACQUIRED): Primary | ICD-10-CM

## 2024-10-08 DIAGNOSIS — Z12.4 SCREENING FOR CERVICAL CANCER: ICD-10-CM

## 2024-10-08 DIAGNOSIS — Z11.3 SCREEN FOR STD (SEXUALLY TRANSMITTED DISEASE): ICD-10-CM

## 2024-10-08 DIAGNOSIS — Z82.49 FAMILY HISTORY OF ISCHEMIC HEART DISEASE AND OTHER DISEASES OF THE CIRCULATORY SYSTEM: ICD-10-CM

## 2024-10-08 PROBLEM — J45.909 EXTRINSIC ASTHMA: Status: ACTIVE | Noted: 2024-10-08

## 2024-10-08 PROCEDURE — 99214 OFFICE O/P EST MOD 30 MIN: CPT | Performed by: INTERNAL MEDICINE

## 2024-10-08 RX ORDER — TIRZEPATIDE 2.5 MG/.5ML
2.5 INJECTION, SOLUTION SUBCUTANEOUS WEEKLY
Qty: 2 ML | Refills: 0 | Status: SHIPPED | OUTPATIENT
Start: 2024-10-08

## 2024-10-08 SDOH — ECONOMIC STABILITY: INCOME INSECURITY: HOW HARD IS IT FOR YOU TO PAY FOR THE VERY BASICS LIKE FOOD, HOUSING, MEDICAL CARE, AND HEATING?: SOMEWHAT HARD

## 2024-10-08 SDOH — ECONOMIC STABILITY: FOOD INSECURITY: WITHIN THE PAST 12 MONTHS, YOU WORRIED THAT YOUR FOOD WOULD RUN OUT BEFORE YOU GOT MONEY TO BUY MORE.: NEVER TRUE

## 2024-10-08 SDOH — ECONOMIC STABILITY: FOOD INSECURITY: WITHIN THE PAST 12 MONTHS, THE FOOD YOU BOUGHT JUST DIDN'T LAST AND YOU DIDN'T HAVE MONEY TO GET MORE.: NEVER TRUE

## 2024-10-08 ASSESSMENT — PATIENT HEALTH QUESTIONNAIRE - PHQ9
SUM OF ALL RESPONSES TO PHQ QUESTIONS 1-9: 0
SUM OF ALL RESPONSES TO PHQ9 QUESTIONS 1 & 2: 0
SUM OF ALL RESPONSES TO PHQ QUESTIONS 1-9: 0
1. LITTLE INTEREST OR PLEASURE IN DOING THINGS: NOT AT ALL
2. FEELING DOWN, DEPRESSED OR HOPELESS: NOT AT ALL

## 2024-10-08 NOTE — PROGRESS NOTES
Assessment/ Plan:   Jaleel was seen today for gynecologic exam.    Diagnoses and all orders for this visit:    Atrophy of thyroid (acquired)-currently not on meds  -     Comprehensive Metabolic Panel; Future  -     TSH; Future    Vaginal discharge  -     PAP IG, CT-NG-TV, Aptima HPV and rfx 16/18,45 (199315); Future  -     PAP IG, CT-NG-TV, Aptima HPV and rfx 16/18,45 (199315)    Severe obesity (BMI 35.0-39.9) with comorbidity-prev saw Endo and was rxd GLP1 but insurance did not cover so we will re-try this; otherwise, she will re-try Phentermine again  -     Lipid Panel; Future  -     Hemoglobin A1C; Future  -     TSH; Future  -     Tirzepatide-Weight Management (ZEPBOUND) 2.5 MG/0.5ML SOAJ; Inject 2.5 mg into the skin once a week    Major depressive disorder, recurrent, moderate (HCC)-declined any meds bec everything she tried in the past made her gain weight    Mild intermittent extrinsic asthma without complication-on prn Albuterol inhaler  -     CBC; Future    External hemorrhoids-s/p hemorrhoidectomy but still with rectal bleeding after Bms and feels she has another hemorrhoid (or same one?) so she will follow up with the surgeon    Family history of ischemic heart disease and other diseases of the circulatory system  -     Lipid Panel; Future    Screening for cervical cancer-she also sees gyn for this  -     PAP IG, CT-NG-TV, Aptima HPV and rfx 16/18,45 (199315); Future  -     PAP IG, CT-NG-TV, Aptima HPV and rfx 16/18,45 (199315)    Screen for STD (sexually transmitted disease)-cultures sent today; advised on safe sex at all times        Follow-up and Dispositions    Return in about 3 months (around 1/8/2025) for follow up, fasting labs shortly.                       Chief Complaint   Patient presents with    Gynecologic Exam       Pt is a 38 y.o. year old female who presents for follow up of her chronic medical problems/acute concerns    Health Maintenance Due   Topic Date Due    Pneumococcal 0-64 years

## 2024-10-08 NOTE — PROGRESS NOTES
\"Have you been to the ER, urgent care clinic since your last visit?  Hospitalized since your last visit?\"    NO    “Have you seen or consulted any other health care providers outside our system since your last visit?”    NO     “Have you had a pap smear?”    Yes- 2024 at [facility]    Date of last Cervical Cancer screen (HPV or PAP): 3/21/2013

## 2024-10-15 DIAGNOSIS — R87.810 ATYPICAL SQUAMOUS CELL CHANGES OF UNDETERMINED SIGNIFICANCE (ASCUS) ON CERVICAL CYTOLOGY WITH POSITIVE HIGH RISK HUMAN PAPILLOMA VIRUS (HPV): Primary | ICD-10-CM

## 2024-10-15 DIAGNOSIS — R87.610 ATYPICAL SQUAMOUS CELL CHANGES OF UNDETERMINED SIGNIFICANCE (ASCUS) ON CERVICAL CYTOLOGY WITH POSITIVE HIGH RISK HUMAN PAPILLOMA VIRUS (HPV): Primary | ICD-10-CM

## 2024-10-15 LAB
C TRACH RRNA CVX QL NAA+PROBE: NEGATIVE
CYTOLOGIST CVX/VAG CYTO: ABNORMAL
CYTOLOGY CVX/VAG DOC CYTO: ABNORMAL
CYTOLOGY CVX/VAG DOC THIN PREP: ABNORMAL
DX ICD CODE: ABNORMAL
DX ICD CODE: ABNORMAL
HPV GENOTYPE REFLEX: ABNORMAL
HPV I/H RISK 4 DNA CVX QL PROBE+SIG AMP: POSITIVE
Lab: ABNORMAL
N GONORRHOEA RRNA CVX QL NAA+PROBE: NEGATIVE
OTHER STN SPEC: ABNORMAL
PATHOLOGIST CVX/VAG CYTO: ABNORMAL
STAT OF ADQ CVX/VAG CYTO-IMP: ABNORMAL
T VAGINALIS RRNA SPEC QL NAA+PROBE: NEGATIVE

## 2024-10-19 LAB
ALBUMIN SERPL-MCNC: 4 G/DL (ref 3.9–4.9)
ALP SERPL-CCNC: 85 IU/L (ref 44–121)
ALT SERPL-CCNC: 13 IU/L (ref 0–32)
AST SERPL-CCNC: 15 IU/L (ref 0–40)
BILIRUB SERPL-MCNC: 0.3 MG/DL (ref 0–1.2)
BUN SERPL-MCNC: 16 MG/DL (ref 6–20)
BUN/CREAT SERPL: 27 (ref 9–23)
CALCIUM SERPL-MCNC: 9.2 MG/DL (ref 8.7–10.2)
CHLORIDE SERPL-SCNC: 105 MMOL/L (ref 96–106)
CHOLEST SERPL-MCNC: 163 MG/DL (ref 100–199)
CO2 SERPL-SCNC: 20 MMOL/L (ref 20–29)
CREAT SERPL-MCNC: 0.6 MG/DL (ref 0.57–1)
EGFRCR SERPLBLD CKD-EPI 2021: 118 ML/MIN/1.73
ERYTHROCYTE [DISTWIDTH] IN BLOOD BY AUTOMATED COUNT: 13.4 % (ref 11.7–15.4)
GLOBULIN SER CALC-MCNC: 2.8 G/DL (ref 1.5–4.5)
GLUCOSE SERPL-MCNC: 82 MG/DL (ref 70–99)
HBA1C MFR BLD: 5.3 % (ref 4.8–5.6)
HCT VFR BLD AUTO: 42.1 % (ref 34–46.6)
HDLC SERPL-MCNC: 45 MG/DL
HGB BLD-MCNC: 13.4 G/DL (ref 11.1–15.9)
LDLC SERPL CALC-MCNC: 101 MG/DL (ref 0–99)
MCH RBC QN AUTO: 29.8 PG (ref 26.6–33)
MCHC RBC AUTO-ENTMCNC: 31.8 G/DL (ref 31.5–35.7)
MCV RBC AUTO: 94 FL (ref 79–97)
PLATELET # BLD AUTO: 385 X10E3/UL (ref 150–450)
POTASSIUM SERPL-SCNC: 4.4 MMOL/L (ref 3.5–5.2)
PROT SERPL-MCNC: 6.8 G/DL (ref 6–8.5)
RBC # BLD AUTO: 4.5 X10E6/UL (ref 3.77–5.28)
SODIUM SERPL-SCNC: 140 MMOL/L (ref 134–144)
TRIGL SERPL-MCNC: 92 MG/DL (ref 0–149)
TSH SERPL DL<=0.005 MIU/L-ACNC: 6.42 UIU/ML (ref 0.45–4.5)
VLDLC SERPL CALC-MCNC: 17 MG/DL (ref 5–40)
WBC # BLD AUTO: 12.8 X10E3/UL (ref 3.4–10.8)

## 2024-11-19 DIAGNOSIS — E66.01 SEVERE OBESITY (BMI 35.0-39.9) WITH COMORBIDITY: ICD-10-CM

## 2025-01-08 ENCOUNTER — OFFICE VISIT (OUTPATIENT)
Facility: CLINIC | Age: 40
End: 2025-01-08
Payer: COMMERCIAL

## 2025-01-08 VITALS
WEIGHT: 203 LBS | DIASTOLIC BLOOD PRESSURE: 73 MMHG | TEMPERATURE: 98.4 F | SYSTOLIC BLOOD PRESSURE: 113 MMHG | BODY MASS INDEX: 37.36 KG/M2 | HEIGHT: 62 IN | OXYGEN SATURATION: 85 % | HEART RATE: 78 BPM | RESPIRATION RATE: 16 BRPM

## 2025-01-08 DIAGNOSIS — E66.01 SEVERE OBESITY (BMI 35.0-39.9) WITH COMORBIDITY: ICD-10-CM

## 2025-01-08 DIAGNOSIS — F33.1 MAJOR DEPRESSIVE DISORDER, RECURRENT, MODERATE (HCC): ICD-10-CM

## 2025-01-08 DIAGNOSIS — E28.2 PCOS (POLYCYSTIC OVARIAN SYNDROME): ICD-10-CM

## 2025-01-08 DIAGNOSIS — Z82.49 FAMILY HISTORY OF ISCHEMIC HEART DISEASE AND OTHER DISEASES OF THE CIRCULATORY SYSTEM: ICD-10-CM

## 2025-01-08 DIAGNOSIS — E03.4 ATROPHY OF THYROID (ACQUIRED): Primary | ICD-10-CM

## 2025-01-08 DIAGNOSIS — R07.9 CHEST PAIN, UNSPECIFIED TYPE: ICD-10-CM

## 2025-01-08 PROCEDURE — 99214 OFFICE O/P EST MOD 30 MIN: CPT | Performed by: INTERNAL MEDICINE

## 2025-01-08 SDOH — ECONOMIC STABILITY: FOOD INSECURITY: WITHIN THE PAST 12 MONTHS, THE FOOD YOU BOUGHT JUST DIDN'T LAST AND YOU DIDN'T HAVE MONEY TO GET MORE.: NEVER TRUE

## 2025-01-08 SDOH — ECONOMIC STABILITY: FOOD INSECURITY: WITHIN THE PAST 12 MONTHS, YOU WORRIED THAT YOUR FOOD WOULD RUN OUT BEFORE YOU GOT MONEY TO BUY MORE.: NEVER TRUE

## 2025-01-08 ASSESSMENT — PATIENT HEALTH QUESTIONNAIRE - PHQ9
SUM OF ALL RESPONSES TO PHQ9 QUESTIONS 1 & 2: 0
SUM OF ALL RESPONSES TO PHQ QUESTIONS 1-9: 0
2. FEELING DOWN, DEPRESSED OR HOPELESS: NOT AT ALL
SUM OF ALL RESPONSES TO PHQ QUESTIONS 1-9: 0
1. LITTLE INTEREST OR PLEASURE IN DOING THINGS: NOT AT ALL

## 2025-01-08 NOTE — PROGRESS NOTES
Assessment/ Plan:   Jaleel was seen today for follow-up chronic condition.    Diagnoses and all orders for this visit:    Atrophy of thyroid (acquired)-will restart Synthroid if Tsh is elevated  -     TSH; Future    PCOS (polycystic ovarian syndrome)-dx by Endo  -     Hemoglobin A1C; Future    Severe obesity (BMI 35.0-39.9) with comorbidity-would benefit from GLP1 but insurance will not cover    Major depressive disorder, recurrent, moderate (HCC)-declined meds, able to cope    Chest pain, unspecified type-strong family hx of CAD  -     [facility] - Deena Zuleta DO, Cardiology, [place] ([address]  -     CBC; Future  -     Comprehensive Metabolic Panel; Future  -     Lipid Panel; Future    Family history of ischemic heart disease and other diseases of the circulatory system  -     [facility] - Deena Zuleta DO, Cardiology, [place] ([address]        Follow-up and Dispositions    Return in about 3 months (around 4/8/2025) for follow up, fasting labs next week.                 Chief Complaint   Patient presents with    Follow-up Chronic Condition     3 month       Pt is a 39 y.o. year old female who presents for follow up of her chronic medical problems    Health Maintenance Due   Topic Date Due    Pneumococcal 0-64 years Vaccine (1 of 2 - PCV) Never done    Varicella vaccine (1 of 2 - 13+ 2-dose series) Never done    Hepatitis B vaccine (1 of 3 - 19+ 3-dose series) Never done    COVID-19 Vaccine (1 - 2023-24 season) Never done        PCOS dx from Endo-would benefit from GLP1  Already tried Phentermine but gained the weight back; had side effects-palpitations, CP      Wt Readings from Last 3 Encounters:   01/08/25 92.1 kg (203 lb)   10/08/24 94.1 kg (207 lb 6.4 oz)   02/08/24 91.2 kg (201 lb)        Insurance did not cover migraine med    BP Readings from Last 3 Encounters:   01/08/25 113/73   10/08/24 131/81   02/08/24 122/84        Hemoglobin A1C   Date Value Ref Range Status   10/18/2024 5.3 4.8 - 5.6 % Final     Comment:

## 2025-01-15 RX ORDER — TIRZEPATIDE 2.5 MG/.5ML
2.5 INJECTION, SOLUTION SUBCUTANEOUS WEEKLY
Qty: 2 ML | Refills: 0 | OUTPATIENT
Start: 2025-01-15

## 2025-01-20 PROBLEM — E28.2 PCOS (POLYCYSTIC OVARIAN SYNDROME): Status: ACTIVE | Noted: 2025-01-20

## 2025-01-29 LAB
ALBUMIN SERPL-MCNC: 3.9 G/DL (ref 3.9–4.9)
ALP SERPL-CCNC: 63 IU/L (ref 44–121)
ALT SERPL-CCNC: 24 IU/L (ref 0–32)
AST SERPL-CCNC: 18 IU/L (ref 0–40)
BILIRUB SERPL-MCNC: <0.2 MG/DL (ref 0–1.2)
BUN SERPL-MCNC: 9 MG/DL (ref 6–20)
BUN/CREAT SERPL: 13 (ref 9–23)
CALCIUM SERPL-MCNC: 8.9 MG/DL (ref 8.7–10.2)
CHLORIDE SERPL-SCNC: 106 MMOL/L (ref 96–106)
CHOLEST SERPL-MCNC: 130 MG/DL (ref 100–199)
CO2 SERPL-SCNC: 22 MMOL/L (ref 20–29)
CREAT SERPL-MCNC: 0.71 MG/DL (ref 0.57–1)
EGFRCR SERPLBLD CKD-EPI 2021: 112 ML/MIN/1.73
ERYTHROCYTE [DISTWIDTH] IN BLOOD BY AUTOMATED COUNT: 12.2 % (ref 11.7–15.4)
GLOBULIN SER CALC-MCNC: 2.4 G/DL (ref 1.5–4.5)
GLUCOSE SERPL-MCNC: 97 MG/DL (ref 70–99)
HBA1C MFR BLD: 5.2 % (ref 4.8–5.6)
HCT VFR BLD AUTO: 40.6 % (ref 34–46.6)
HDLC SERPL-MCNC: 31 MG/DL
HGB BLD-MCNC: 13.2 G/DL (ref 11.1–15.9)
LDLC SERPL CALC-MCNC: 84 MG/DL (ref 0–99)
MCH RBC QN AUTO: 30 PG (ref 26.6–33)
MCHC RBC AUTO-ENTMCNC: 32.5 G/DL (ref 31.5–35.7)
MCV RBC AUTO: 92 FL (ref 79–97)
PLATELET # BLD AUTO: 345 X10E3/UL (ref 150–450)
POTASSIUM SERPL-SCNC: 4 MMOL/L (ref 3.5–5.2)
PROT SERPL-MCNC: 6.3 G/DL (ref 6–8.5)
RBC # BLD AUTO: 4.4 X10E6/UL (ref 3.77–5.28)
SODIUM SERPL-SCNC: 142 MMOL/L (ref 134–144)
TRIGL SERPL-MCNC: 76 MG/DL (ref 0–149)
TSH SERPL DL<=0.005 MIU/L-ACNC: 3.54 UIU/ML (ref 0.45–4.5)
VLDLC SERPL CALC-MCNC: 15 MG/DL (ref 5–40)
WBC # BLD AUTO: 6.4 X10E3/UL (ref 3.4–10.8)

## 2025-05-08 ENCOUNTER — OFFICE VISIT (OUTPATIENT)
Facility: CLINIC | Age: 40
End: 2025-05-08
Payer: COMMERCIAL

## 2025-05-08 VITALS
TEMPERATURE: 98.5 F | RESPIRATION RATE: 16 BRPM | HEIGHT: 62 IN | WEIGHT: 187 LBS | SYSTOLIC BLOOD PRESSURE: 109 MMHG | BODY MASS INDEX: 34.41 KG/M2 | OXYGEN SATURATION: 97 % | HEART RATE: 72 BPM | DIASTOLIC BLOOD PRESSURE: 72 MMHG

## 2025-05-08 DIAGNOSIS — E66.01 SEVERE OBESITY (BMI 35.0-39.9) WITH COMORBIDITY (HCC): Primary | ICD-10-CM

## 2025-05-08 DIAGNOSIS — G43.009 MIGRAINE WITHOUT AURA AND WITHOUT STATUS MIGRAINOSUS, NOT INTRACTABLE: ICD-10-CM

## 2025-05-08 DIAGNOSIS — J45.20 MILD INTERMITTENT EXTRINSIC ASTHMA WITHOUT COMPLICATION: ICD-10-CM

## 2025-05-08 PROCEDURE — 99214 OFFICE O/P EST MOD 30 MIN: CPT | Performed by: INTERNAL MEDICINE

## 2025-05-08 RX ORDER — PHENTERMINE HYDROCHLORIDE 37.5 MG/1
37.5 TABLET ORAL
Qty: 30 TABLET | Refills: 2 | Status: SHIPPED | OUTPATIENT
Start: 2025-05-08 | End: 2025-08-06

## 2025-05-08 ASSESSMENT — PATIENT HEALTH QUESTIONNAIRE - PHQ9
3. TROUBLE FALLING OR STAYING ASLEEP: NOT AT ALL
SUM OF ALL RESPONSES TO PHQ QUESTIONS 1-9: 0
2. FEELING DOWN, DEPRESSED OR HOPELESS: NOT AT ALL
6. FEELING BAD ABOUT YOURSELF - OR THAT YOU ARE A FAILURE OR HAVE LET YOURSELF OR YOUR FAMILY DOWN: NOT AT ALL
1. LITTLE INTEREST OR PLEASURE IN DOING THINGS: NOT AT ALL
7. TROUBLE CONCENTRATING ON THINGS, SUCH AS READING THE NEWSPAPER OR WATCHING TELEVISION: NOT AT ALL
SUM OF ALL RESPONSES TO PHQ QUESTIONS 1-9: 0
SUM OF ALL RESPONSES TO PHQ QUESTIONS 1-9: 0
5. POOR APPETITE OR OVEREATING: NOT AT ALL
SUM OF ALL RESPONSES TO PHQ QUESTIONS 1-9: 0
8. MOVING OR SPEAKING SO SLOWLY THAT OTHER PEOPLE COULD HAVE NOTICED. OR THE OPPOSITE, BEING SO FIGETY OR RESTLESS THAT YOU HAVE BEEN MOVING AROUND A LOT MORE THAN USUAL: NOT AT ALL
9. THOUGHTS THAT YOU WOULD BE BETTER OFF DEAD, OR OF HURTING YOURSELF: NOT AT ALL
4. FEELING TIRED OR HAVING LITTLE ENERGY: NOT AT ALL
10. IF YOU CHECKED OFF ANY PROBLEMS, HOW DIFFICULT HAVE THESE PROBLEMS MADE IT FOR YOU TO DO YOUR WORK, TAKE CARE OF THINGS AT HOME, OR GET ALONG WITH OTHER PEOPLE: NOT DIFFICULT AT ALL

## 2025-05-08 NOTE — PROGRESS NOTES
Assessment/ Plan:   Jaleel was seen today for follow-up chronic condition.    Diagnoses and all orders for this visit:    Severe obesity (BMI 35.0-39.9) with comorbidity (HCC)-discussed limiting adi to 2918-4685/day and exercising at least 150 min a week ; commended about recent weight loss of 16 pounds by diet and exercise  Wanted to take Phentermine again to see if this could help her lose some more weight  Insurance did not cover GLP1  -     phentermine (ADIPEX-P) 37.5 MG tablet; Take 1 tablet by mouth every morning (before breakfast) for 90 days. Max Daily Amount: 37.5 mg    Migraine without aura and without status migrainosus, not intractable-did well on Nurtec, currently no sxs    Mild intermittent extrinsic asthma without complication-mostly exercise induced; advised to take 2 puffs prior to exercise        Follow-up and Dispositions    Return in about 3 months (around 8/8/2025) for follow up.                 Chief Complaint   Patient presents with    Follow-up Chronic Condition     4 month       Pt is a 39 y.o. year old female who presents for follow up of her chronic medical problems    Health Maintenance Due   Topic Date Due    Varicella vaccine (1 of 2 - 13+ 2-dose series) Never done    Hepatitis B vaccine (1 of 3 - 19+ 3-dose series) Never done    Pneumococcal 0-49 years Vaccine (1 of 2 - PCV) Never done    COVID-19 Vaccine (1 - 2024-25 season) Never done      Wt Readings from Last 3 Encounters:   05/08/25 84.8 kg (187 lb)   01/08/25 92.1 kg (203 lb)   10/08/24 94.1 kg (207 lb 6.4 oz)      Has been going to the gym  Less than 1500 adi/day, more protein  Meal prepping      Lab Results   Component Value Date    TSH 3.540 01/28/2025        Not too many migraines    Allergy induces asthma      ROS:    Pt denies: Wt loss, Fever/Chills, HA, Visual changes, Fatigue, Chest pain, SOB, SANDERS, Abd pain, N/V/D/C, Blood in stool or urine, Edema. Pertinent positive as above in HPI. All others were negative    Patient

## 2025-05-22 ENCOUNTER — OFFICE VISIT (OUTPATIENT)
Age: 40
End: 2025-05-22
Payer: COMMERCIAL

## 2025-05-22 VITALS
OXYGEN SATURATION: 96 % | SYSTOLIC BLOOD PRESSURE: 110 MMHG | WEIGHT: 188 LBS | HEART RATE: 74 BPM | HEIGHT: 62 IN | BODY MASS INDEX: 34.6 KG/M2 | DIASTOLIC BLOOD PRESSURE: 77 MMHG

## 2025-05-22 DIAGNOSIS — R06.02 SHORTNESS OF BREATH: ICD-10-CM

## 2025-05-22 DIAGNOSIS — R07.9 CHEST PAIN, UNSPECIFIED TYPE: Primary | ICD-10-CM

## 2025-05-22 DIAGNOSIS — R00.2 PALPITATIONS: ICD-10-CM

## 2025-05-22 PROCEDURE — 99203 OFFICE O/P NEW LOW 30 MIN: CPT | Performed by: INTERNAL MEDICINE

## 2025-05-22 PROCEDURE — 93000 ELECTROCARDIOGRAM COMPLETE: CPT | Performed by: INTERNAL MEDICINE

## 2025-05-22 NOTE — PATIENT INSTRUCTIONS
Testing   Treadmill Stress     Echo  PATIENT PREPS:   -Wear easy to remove shirt to your appointment for easier accessibility.      **call office 3-5 days after testing is completed for results** Please ensure testing is completed prior to scheduled follow up appointment. If it is not completed your appointment may be rescheduled**

## 2025-05-22 NOTE — PROGRESS NOTES
Allergies and Sensitivities:  Allergies   Allergen Reactions    Hydrocodone Nausea And Vomiting       Family History:  Family History   Problem Relation Age of Onset    Cancer Maternal Grandmother         bone       Social History:  Social History     Tobacco Use    Smoking status: Never    Smokeless tobacco: Never   Substance Use Topics    Alcohol use: Yes     Alcohol/week: 2.0 standard drinks of alcohol    Drug use: No     She  reports that she has never smoked. She has never used smokeless tobacco.  She  reports current alcohol use of about 2.0 standard drinks of alcohol per week.    Physical Exam:  BP Readings from Last 3 Encounters:   05/22/25 110/77   05/08/25 109/72   01/08/25 113/73         Pulse Readings from Last 3 Encounters:   05/22/25 74   05/08/25 72   01/08/25 78          Wt Readings from Last 3 Encounters:   05/22/25 85.3 kg (188 lb)   05/08/25 84.8 kg (187 lb)   01/08/25 92.1 kg (203 lb)       Constitutional: Oriented to person, place, and time.   HENT: Head: Normocephalic and atraumatic. Eyes: Conjunctivae and extraocular motions are normal.   Neck: No JVD present. Carotid bruit is not appreciated.   Cardiovascular: Regular rhythm.   No murmur, gallop or rubs appreciated  Lung: Breath sounds normal. No respiratory distress. No ronchi or rales appreciated  Abdominal: No tenderness. No rebound and no guarding.   Musculoskeletal: There is no lower extremity edema. No cynosis  Lymphadenopathy:  No cervical or supraclavicular adenopathy appriciated.   Neurological: No gross motor deficit noted.  Skin: No visible skin rash noted.   No Ear discharge noted  Psychiatric: Normal mood and affect.     LABS:   @  Lab Results   Component Value Date/Time    WBC 6.4 01/28/2025 09:50 AM    HGB 13.2 01/28/2025 09:50 AM    HCT 40.6 01/28/2025 09:50 AM     01/28/2025 09:50 AM    MCV 92 01/28/2025 09:50 AM     Lab Results   Component Value Date/Time     01/28/2025 09:50 AM    K 4.0 01/28/2025 09:50

## 2025-08-28 ENCOUNTER — OFFICE VISIT (OUTPATIENT)
Facility: CLINIC | Age: 40
End: 2025-08-28
Payer: COMMERCIAL

## 2025-08-28 VITALS
HEIGHT: 62 IN | BODY MASS INDEX: 32.09 KG/M2 | SYSTOLIC BLOOD PRESSURE: 110 MMHG | WEIGHT: 174.4 LBS | RESPIRATION RATE: 16 BRPM | HEART RATE: 73 BPM | DIASTOLIC BLOOD PRESSURE: 76 MMHG | OXYGEN SATURATION: 97 % | TEMPERATURE: 98.5 F

## 2025-08-28 DIAGNOSIS — K64.9 HEMORRHOIDS, UNSPECIFIED HEMORRHOID TYPE: ICD-10-CM

## 2025-08-28 DIAGNOSIS — Z00.00 WELL WOMAN EXAM (NO GYNECOLOGICAL EXAM): Primary | ICD-10-CM

## 2025-08-28 DIAGNOSIS — E66.811 CLASS 1 OBESITY WITHOUT SERIOUS COMORBIDITY WITH BODY MASS INDEX (BMI) OF 31.0 TO 31.9 IN ADULT, UNSPECIFIED OBESITY TYPE: ICD-10-CM

## 2025-08-28 DIAGNOSIS — G43.009 MIGRAINE WITHOUT AURA AND WITHOUT STATUS MIGRAINOSUS, NOT INTRACTABLE: ICD-10-CM

## 2025-08-28 DIAGNOSIS — E03.4 ATROPHY OF THYROID (ACQUIRED): ICD-10-CM

## 2025-08-28 DIAGNOSIS — R19.4 CHANGE IN BOWEL HABITS: ICD-10-CM

## 2025-08-28 PROCEDURE — 99395 PREV VISIT EST AGE 18-39: CPT | Performed by: INTERNAL MEDICINE

## 2025-08-28 RX ORDER — PHENTERMINE HYDROCHLORIDE 37.5 MG/1
37.5 TABLET ORAL
Qty: 30 TABLET | Refills: 2 | Status: SHIPPED | OUTPATIENT
Start: 2025-08-28 | End: 2025-11-26

## 2025-08-29 LAB
ALBUMIN SERPL-MCNC: 4.5 G/DL (ref 3.9–4.9)
ALP SERPL-CCNC: 84 IU/L (ref 44–121)
ALT SERPL-CCNC: 15 IU/L (ref 0–32)
AST SERPL-CCNC: 15 IU/L (ref 0–40)
BILIRUB SERPL-MCNC: 0.5 MG/DL (ref 0–1.2)
BUN SERPL-MCNC: 7 MG/DL (ref 6–20)
BUN/CREAT SERPL: 13 (ref 9–23)
CALCIUM SERPL-MCNC: 9.7 MG/DL (ref 8.7–10.2)
CHLORIDE SERPL-SCNC: 102 MMOL/L (ref 96–106)
CHOLEST SERPL-MCNC: 143 MG/DL (ref 100–199)
CO2 SERPL-SCNC: 23 MMOL/L (ref 20–29)
CREAT SERPL-MCNC: 0.56 MG/DL (ref 0.57–1)
EGFRCR SERPLBLD CKD-EPI 2021: 120 ML/MIN/1.73
ERYTHROCYTE [DISTWIDTH] IN BLOOD BY AUTOMATED COUNT: 12.7 % (ref 11.7–15.4)
EST. AVERAGE GLUCOSE BLD GHB EST-MCNC: 97 MG/DL
GLOBULIN SER CALC-MCNC: 2.4 G/DL (ref 1.5–4.5)
GLUCOSE SERPL-MCNC: 84 MG/DL (ref 70–99)
HBA1C MFR BLD: 5 % (ref 4.8–5.6)
HCT VFR BLD AUTO: 44.7 % (ref 34–46.6)
HDLC SERPL-MCNC: 49 MG/DL
HGB BLD-MCNC: 14 G/DL (ref 11.1–15.9)
LDLC SERPL CALC-MCNC: 82 MG/DL (ref 0–99)
MCH RBC QN AUTO: 30 PG (ref 26.6–33)
MCHC RBC AUTO-ENTMCNC: 31.3 G/DL (ref 31.5–35.7)
MCV RBC AUTO: 96 FL (ref 79–97)
PLATELET # BLD AUTO: 422 X10E3/UL (ref 150–450)
POTASSIUM SERPL-SCNC: 4.4 MMOL/L (ref 3.5–5.2)
PROT SERPL-MCNC: 6.9 G/DL (ref 6–8.5)
RBC # BLD AUTO: 4.67 X10E6/UL (ref 3.77–5.28)
SODIUM SERPL-SCNC: 140 MMOL/L (ref 134–144)
TRIGL SERPL-MCNC: 60 MG/DL (ref 0–149)
TSH SERPL DL<=0.005 MIU/L-ACNC: 3.38 UIU/ML (ref 0.45–4.5)
VLDLC SERPL CALC-MCNC: 12 MG/DL (ref 5–40)
WBC # BLD AUTO: 10.5 X10E3/UL (ref 3.4–10.8)